# Patient Record
Sex: FEMALE | Race: WHITE | NOT HISPANIC OR LATINO | Employment: PART TIME | URBAN - METROPOLITAN AREA
[De-identification: names, ages, dates, MRNs, and addresses within clinical notes are randomized per-mention and may not be internally consistent; named-entity substitution may affect disease eponyms.]

---
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
## 2020-04-17 ENCOUNTER — HOSPITAL ENCOUNTER (OUTPATIENT)
Dept: LABOR AND DELIVERY | Facility: HOSPITAL | Age: 20
Discharge: HOME | End: 2020-04-17
Attending: OBSTETRICS & GYNECOLOGY

## 2020-04-17 LAB
ALBUMIN SERPL-MCNC: 3.6 G/DL (ref 3.5–5)
ALBUMIN/GLOB SERPL: 1.3 {RATIO} (ref 1.4–2.6)
ALP SERPL-CCNC: 174 U/L (ref 42–98)
ALT SERPL-CCNC: 18 U/L (ref 10–40)
ANION GAP SERPL CALC-SCNC: 17 MMOL/L (ref 8–19)
AST SERPL-CCNC: 18 U/L (ref 15–50)
BASOPHILS # BLD AUTO: 0.08 10*3/UL (ref 0–0.2)
BASOPHILS NFR BLD AUTO: 0.6 % (ref 0–3)
BILIRUB SERPL-MCNC: <0.15 MG/DL (ref 0.2–1.3)
BUN SERPL-MCNC: 7 MG/DL (ref 5–25)
BUN/CREAT SERPL: 12 {RATIO} (ref 6–20)
CALCIUM SERPL-MCNC: 9.3 MG/DL (ref 8.7–10.4)
CHLORIDE SERPL-SCNC: 101 MMOL/L (ref 99–111)
CONV ABS IMM GRAN: 0.08 10*3/UL (ref 0–0.2)
CONV CO2: 22 MMOL/L (ref 22–32)
CONV CREATININE URINE, RANDOM: 27.5 MG/DL (ref 10–300)
CONV IMMATURE GRAN: 0.6 % (ref 0–1.8)
CONV PROTEIN TO CREATININE RATIO (RANDOM URINE): 0.3 {RATIO} (ref 0–0.1)
CONV TOTAL PROTEIN: 6.3 G/DL (ref 6.3–8.2)
CREAT UR-MCNC: 0.6 MG/DL (ref 0.5–0.9)
DEPRECATED RDW RBC AUTO: 39.3 FL (ref 36.4–46.3)
EOSINOPHIL # BLD AUTO: 0.97 10*3/UL (ref 0–0.7)
EOSINOPHIL # BLD AUTO: 7.8 % (ref 0–7)
ERYTHROCYTE [DISTWIDTH] IN BLOOD BY AUTOMATED COUNT: 13 % (ref 11.7–14.4)
GFR SERPLBLD BASED ON 1.73 SQ M-ARVRAT: >60 ML/MIN/{1.73_M2}
GLOBULIN UR ELPH-MCNC: 2.7 G/DL (ref 2–3.5)
GLUCOSE SERPL-MCNC: 108 MG/DL (ref 65–99)
HCT VFR BLD AUTO: 36.5 % (ref 37–47)
HGB BLD-MCNC: 11.6 G/DL (ref 12–16)
LYMPHOCYTES # BLD AUTO: 1.68 10*3/UL (ref 1–5)
LYMPHOCYTES NFR BLD AUTO: 13.5 % (ref 20–45)
MCH RBC QN AUTO: 26.7 PG (ref 27–31)
MCHC RBC AUTO-ENTMCNC: 31.8 G/DL (ref 33–37)
MCV RBC AUTO: 83.9 FL (ref 81–99)
MONOCYTES # BLD AUTO: 0.76 10*3/UL (ref 0.2–1.2)
MONOCYTES NFR BLD AUTO: 6.1 % (ref 3–10)
NEUTROPHILS # BLD AUTO: 8.83 10*3/UL (ref 2–8)
NEUTROPHILS NFR BLD AUTO: 71.4 % (ref 30–85)
NRBC CBCN: 0 % (ref 0–0.7)
OSMOLALITY SERPL CALC.SUM OF ELEC: 281 MOSM/KG (ref 273–304)
PLATELET # BLD AUTO: 186 10*3/UL (ref 130–400)
PMV BLD AUTO: 11.9 FL (ref 9.4–12.3)
POTASSIUM SERPL-SCNC: 4 MMOL/L (ref 3.5–5.3)
PROT UR-MCNC: 8.3 MG/DL
RBC # BLD AUTO: 4.35 10*6/UL (ref 4.2–5.4)
SODIUM SERPL-SCNC: 136 MMOL/L (ref 135–147)
WBC # BLD AUTO: 12.4 10*3/UL (ref 4.8–10.8)

## 2020-04-20 ENCOUNTER — HOSPITAL ENCOUNTER (OUTPATIENT)
Dept: LABOR AND DELIVERY | Facility: HOSPITAL | Age: 20
Discharge: HOME | End: 2020-04-20
Attending: OBSTETRICS & GYNECOLOGY

## 2020-04-20 LAB
PROT 24H UR-MRATE: 112.1 MG/(24.H) (ref 42–225)
PROT 24H UR-MRATE: 5.9 MG/DL (ref 0–12)
SPECIMEN VOL 24H UR: 1900 ML

## 2021-02-14 ENCOUNTER — HOSPITAL ENCOUNTER (OUTPATIENT)
Dept: URGENT CARE | Facility: CLINIC | Age: 21
Discharge: HOME | End: 2021-02-14
Attending: NURSE PRACTITIONER

## 2021-02-16 LAB — BACTERIA SPEC AEROBE CULT: NORMAL

## 2021-02-17 LAB — SARS-COV-2 RNA SPEC QL NAA+PROBE: DETECTED

## 2021-05-15 VITALS — HEIGHT: 64 IN | HEART RATE: 74 BPM | BODY MASS INDEX: 23.26 KG/M2 | OXYGEN SATURATION: 98 % | WEIGHT: 136.25 LBS

## 2021-05-16 VITALS — OXYGEN SATURATION: 97 % | HEART RATE: 88 BPM | HEIGHT: 64 IN

## 2021-08-27 PROCEDURE — 87086 URINE CULTURE/COLONY COUNT: CPT | Performed by: EMERGENCY MEDICINE

## 2021-08-27 PROCEDURE — U0003 INFECTIOUS AGENT DETECTION BY NUCLEIC ACID (DNA OR RNA); SEVERE ACUTE RESPIRATORY SYNDROME CORONAVIRUS 2 (SARS-COV-2) (CORONAVIRUS DISEASE [COVID-19]), AMPLIFIED PROBE TECHNIQUE, MAKING USE OF HIGH THROUGHPUT TECHNOLOGIES AS DESCRIBED BY CMS-2020-01-R: HCPCS | Performed by: EMERGENCY MEDICINE

## 2021-08-28 ENCOUNTER — TELEPHONE (OUTPATIENT)
Dept: URGENT CARE | Facility: CLINIC | Age: 21
End: 2021-08-28

## 2021-08-28 NOTE — TELEPHONE ENCOUNTER
----- Message from YULI Ross sent at 8/28/2021  4:53 PM EDT -----  Please call the patient regarding her normal result.  Results of urine culture is not indicative of a urinary tract infection.

## 2021-08-30 ENCOUNTER — TELEPHONE (OUTPATIENT)
Dept: URGENT CARE | Facility: CLINIC | Age: 21
End: 2021-08-30

## 2021-12-20 RX ORDER — LEVONORGESTREL / ETHINYL ESTRADIOL AND ETHINYL ESTRADIOL 150-30(84)
KIT ORAL
Qty: 91 EACH | Refills: 0 | Status: SHIPPED | OUTPATIENT
Start: 2021-12-20 | End: 2022-04-13

## 2022-03-21 RX ORDER — LEVONORGESTREL / ETHINYL ESTRADIOL AND ETHINYL ESTRADIOL 150-30(84)
KIT ORAL
OUTPATIENT
Start: 2022-03-21

## 2022-04-13 ENCOUNTER — INITIAL PRENATAL (OUTPATIENT)
Dept: OBSTETRICS AND GYNECOLOGY | Facility: CLINIC | Age: 22
End: 2022-04-13

## 2022-04-13 VITALS — WEIGHT: 171 LBS | SYSTOLIC BLOOD PRESSURE: 133 MMHG | DIASTOLIC BLOOD PRESSURE: 92 MMHG | BODY MASS INDEX: 29.35 KG/M2

## 2022-04-13 DIAGNOSIS — Q21.10 ASD (ATRIAL SEPTAL DEFECT): ICD-10-CM

## 2022-04-13 DIAGNOSIS — Z34.80 SUPERVISION OF OTHER NORMAL PREGNANCY, ANTEPARTUM: Primary | ICD-10-CM

## 2022-04-13 DIAGNOSIS — Q21.12 PATENT FORAMEN OVALE: ICD-10-CM

## 2022-04-13 LAB
ABO GROUP BLD: NORMAL
B-HCG UR QL: POSITIVE
BASOPHILS # BLD AUTO: 0.05 10*3/MM3 (ref 0–0.2)
BASOPHILS NFR BLD AUTO: 0.6 % (ref 0–1.5)
BLD GP AB SCN SERPL QL: NEGATIVE
DEPRECATED RDW RBC AUTO: 38.8 FL (ref 37–54)
EOSINOPHIL # BLD AUTO: 0.05 10*3/MM3 (ref 0–0.4)
EOSINOPHIL NFR BLD AUTO: 0.6 % (ref 0.3–6.2)
ERYTHROCYTE [DISTWIDTH] IN BLOOD BY AUTOMATED COUNT: 12.3 % (ref 12.3–15.4)
EXPIRATION DATE: ABNORMAL
GLUCOSE UR STRIP-MCNC: NEGATIVE MG/DL
HBV SURFACE AG SERPL QL IA: NORMAL
HCT VFR BLD AUTO: 43.4 % (ref 34–46.6)
HGB BLD-MCNC: 14.4 G/DL (ref 12–15.9)
HIV1+2 AB SER QL: NORMAL
IMM GRANULOCYTES # BLD AUTO: 0.04 10*3/MM3 (ref 0–0.05)
IMM GRANULOCYTES NFR BLD AUTO: 0.5 % (ref 0–0.5)
INTERNAL NEGATIVE CONTROL: ABNORMAL
INTERNAL POSITIVE CONTROL: ABNORMAL
LYMPHOCYTES # BLD AUTO: 1.85 10*3/MM3 (ref 0.7–3.1)
LYMPHOCYTES NFR BLD AUTO: 21.5 % (ref 19.6–45.3)
Lab: ABNORMAL
MCH RBC QN AUTO: 28.9 PG (ref 26.6–33)
MCHC RBC AUTO-ENTMCNC: 33.2 G/DL (ref 31.5–35.7)
MCV RBC AUTO: 87.1 FL (ref 79–97)
MONOCYTES # BLD AUTO: 0.44 10*3/MM3 (ref 0.1–0.9)
MONOCYTES NFR BLD AUTO: 5.1 % (ref 5–12)
NEUTROPHILS NFR BLD AUTO: 6.19 10*3/MM3 (ref 1.7–7)
NEUTROPHILS NFR BLD AUTO: 71.7 % (ref 42.7–76)
NRBC BLD AUTO-RTO: 0 /100 WBC (ref 0–0.2)
PLATELET # BLD AUTO: 222 10*3/MM3 (ref 140–450)
PMV BLD AUTO: 11.7 FL (ref 6–12)
PROT UR STRIP-MCNC: NEGATIVE MG/DL
RBC # BLD AUTO: 4.98 10*6/MM3 (ref 3.77–5.28)
RH BLD: POSITIVE
RPR SER QL: NORMAL
WBC NRBC COR # BLD: 8.62 10*3/MM3 (ref 3.4–10.8)

## 2022-04-13 PROCEDURE — 87661 TRICHOMONAS VAGINALIS AMPLIF: CPT | Performed by: OBSTETRICS & GYNECOLOGY

## 2022-04-13 PROCEDURE — G0432 EIA HIV-1/HIV-2 SCREEN: HCPCS | Performed by: OBSTETRICS & GYNECOLOGY

## 2022-04-13 PROCEDURE — G0123 SCREEN CERV/VAG THIN LAYER: HCPCS | Performed by: OBSTETRICS & GYNECOLOGY

## 2022-04-13 PROCEDURE — 81025 URINE PREGNANCY TEST: CPT | Performed by: OBSTETRICS & GYNECOLOGY

## 2022-04-13 PROCEDURE — 87086 URINE CULTURE/COLONY COUNT: CPT | Performed by: OBSTETRICS & GYNECOLOGY

## 2022-04-13 PROCEDURE — 0501F PRENATAL FLOW SHEET: CPT | Performed by: OBSTETRICS & GYNECOLOGY

## 2022-04-13 PROCEDURE — 87591 N.GONORRHOEAE DNA AMP PROB: CPT | Performed by: OBSTETRICS & GYNECOLOGY

## 2022-04-13 PROCEDURE — 87491 CHLMYD TRACH DNA AMP PROBE: CPT | Performed by: OBSTETRICS & GYNECOLOGY

## 2022-04-13 PROCEDURE — 83020 HEMOGLOBIN ELECTROPHORESIS: CPT | Performed by: OBSTETRICS & GYNECOLOGY

## 2022-04-13 PROCEDURE — 86803 HEPATITIS C AB TEST: CPT | Performed by: OBSTETRICS & GYNECOLOGY

## 2022-04-13 NOTE — PROGRESS NOTES
OB Initial Visit    CC- Here for care of current pregnancy     Subjective:  21 y.o.  presenting for her first obstetrical visit.    LMP: Patient's last menstrual period was 2022.   She has no complaints today.  She denies any vaginal bleeding.  She reports some nausea.    Reviewed and updated:  OBHx, GYNHx (STDs), PMHx, Medications, Allergies, PSHx, Social Hx, Preventative Hx (PAP), Hx of abuse/safe environment, Vaccine Hx including hx of chickenpox or vaccine, Genetic Hx (pt, FOB, both families).        Objective:  /92   Wt 77.6 kg (171 lb)   LMP 2022   BMI 29.35 kg/m²   General- NAD, alert and oriented, appropriate  Psych- Normal mood, good memory  Neck- No masses, no thyroid enlargement  CV- Regular rhythm, no murnurs  Resp- CTA to bases, no wheezes  Abdomen- Soft, non distended, non tender, no masses     External genitalia- Normal, no lesions  Urethra- Normal, no masses, non tender  Vagina- Normal, no discharge  Bladder- Normal, no masses, non tender  Cvx- Normal, no lesions, no discharge, no CMT  Uterus- Normal shape and consistency, non tender, Consistent with dates  Adnexa- Normal, no mass, non tender    Lymphatic- No palpable neck, axillary, or groin nodes  Ext- No edema, no cyanosis    Skin- No lesions, no rashes, no acanthosis nigricans    Bedside ultrasound: Sweet intrauterine gestation with positive fetal cardiac activity.  Heart rate 165 bpm.  No gross abnormalities noted on brief and limited scan    Assessment and Plan:  Diagnoses and all orders for this visit:    1. Supervision of other normal pregnancy, antepartum (Primary)  -     POC Urinalysis Dipstick  -     POC Pregnancy, Urine  -     IGP,CtNgTv,rfx Aptima HPV ASCU  -     OB Panel With HIV  -     Urine Culture - Urine, Urine, Clean Catch  -     Hemoglobinopathy Fractionation [place]  -     US Ob < 14 Weeks Single or First Gestation; Future    2. ASD (atrial septal defect)  -     Adult Transthoracic Echo Complete w/  Color, Spectral and Contrast if necessary per protocol; Future    3. Patent foramen ovale      9w6d    Genetic Screening: Counseled.  Declines all.     Vaccines: Recommend FLU vaccine this season, R/B discussed  Recommend COVID vaccine, R/B discussed    Counseling: Nutrition discussed, calories, activity/exercise in pregnancy  Discussed dietary restrictions/safety food preparation in pregnancy  Reviewed what to expect prenatal visits, office providers  Appropriate trimester precautions provided, N/V, vag bleeding, cramping  Questions answered    Return in about 4 weeks (around 5/11/2022) for Recheck.      Jhonny Tabares MD  04/13/2022

## 2022-04-14 LAB — HCV AB SER DONR QL: NORMAL

## 2022-04-15 LAB
BACTERIA SPEC AEROBE CULT: ABNORMAL
HGB A MFR BLD ELPH: 97.3 % (ref 96.4–98.8)
HGB A2 MFR BLD ELPH: 2.7 % (ref 1.8–3.2)
HGB F MFR BLD ELPH: 0 % (ref 0–2)
HGB FRACT BLD-IMP: NORMAL
HGB S MFR BLD ELPH: 0 %
RUBV IGG SERPL IA-ACNC: 2.59 INDEX

## 2022-04-15 NOTE — ASSESSMENT & PLAN NOTE
Check dating ultrasound  Check prenatal labs  Patient declines prenatal genetic screening  Continue prenatal vitamins  Recommended COVID-19 vaccine and flu vaccine.  Reviewed nutrition, exercise and dietary restrictions in pregnancy.  Reviewed appropriate weight gain in pregnancy  Reviewed office visit schedule and call rotation

## 2022-04-21 LAB
C TRACH RRNA CVX QL NAA+PROBE: NEGATIVE
CONV .: NORMAL
CYTOLOGIST CVX/VAG CYTO: NORMAL
CYTOLOGY CVX/VAG DOC CYTO: NORMAL
CYTOLOGY CVX/VAG DOC THIN PREP: NORMAL
DX ICD CODE: NORMAL
HIV 1 & 2 AB SER-IMP: NORMAL
N GONORRHOEA RRNA CVX QL NAA+PROBE: NEGATIVE
OTHER STN SPEC: NORMAL
STAT OF ADQ CVX/VAG CYTO-IMP: NORMAL
T VAGINALIS RRNA SPEC QL NAA+PROBE: NEGATIVE

## 2022-05-09 ENCOUNTER — HOSPITAL ENCOUNTER (OUTPATIENT)
Dept: CARDIOLOGY | Facility: HOSPITAL | Age: 22
Discharge: HOME | End: 2022-05-09
Admitting: OBSTETRICS & GYNECOLOGY

## 2022-05-09 DIAGNOSIS — Q21.10 ASD (ATRIAL SEPTAL DEFECT): ICD-10-CM

## 2022-05-09 LAB
BH CV ECHO MEAS - AO ROOT DIAM: 2.2 CM
BH CV ECHO MEAS - EF(MOD-BP): 65 %
BH CV ECHO MEAS - IVSD: 0.8 CM
BH CV ECHO MEAS - LA DIMENSION(2D): 2.6 CM
BH CV ECHO MEAS - LAT PEAK E' VEL: 13 CM/SEC
BH CV ECHO MEAS - LVIDD: 4.4 CM
BH CV ECHO MEAS - LVIDS: 2.2 CM
BH CV ECHO MEAS - LVPWD: 0.8 CM
BH CV ECHO MEAS - MED PEAK E' VEL: 19 CM/SEC
BH CV ECHO MEAS - MV A MAX VEL: 55 CM/SEC
BH CV ECHO MEAS - MV DEC TIME: 157 MSEC
BH CV ECHO MEAS - MV E MAX VEL: 83 CM/SEC
BH CV ECHO MEAS - MV E/A: 1.5
BH CV ECHO MEASUREMENTS AVERAGE E/E' RATIO: 5.19
IVRT: 66 MSEC
LEFT ATRIUM VOLUME INDEX: 9 ML/M2
MAXIMAL PREDICTED HEART RATE: 198 BPM
STRESS TARGET HR: 168 BPM

## 2022-05-09 PROCEDURE — 93306 TTE W/DOPPLER COMPLETE: CPT

## 2022-05-11 ENCOUNTER — HOSPITAL ENCOUNTER (OUTPATIENT)
Dept: ULTRASOUND IMAGING | Facility: HOSPITAL | Age: 22
Discharge: HOME | End: 2022-05-11
Admitting: OBSTETRICS & GYNECOLOGY

## 2022-05-11 DIAGNOSIS — Z34.80 SUPERVISION OF OTHER NORMAL PREGNANCY, ANTEPARTUM: ICD-10-CM

## 2022-05-11 PROCEDURE — 76801 OB US < 14 WKS SINGLE FETUS: CPT

## 2022-05-17 ENCOUNTER — ROUTINE PRENATAL (OUTPATIENT)
Dept: OBSTETRICS AND GYNECOLOGY | Facility: CLINIC | Age: 22
End: 2022-05-17

## 2022-05-17 VITALS — BODY MASS INDEX: 29.18 KG/M2 | SYSTOLIC BLOOD PRESSURE: 118 MMHG | WEIGHT: 170 LBS | DIASTOLIC BLOOD PRESSURE: 77 MMHG

## 2022-05-17 DIAGNOSIS — Q21.10 ASD (ATRIAL SEPTAL DEFECT): ICD-10-CM

## 2022-05-17 DIAGNOSIS — Q21.12 PATENT FORAMEN OVALE: ICD-10-CM

## 2022-05-17 DIAGNOSIS — Z34.80 SUPERVISION OF OTHER NORMAL PREGNANCY, ANTEPARTUM: Primary | ICD-10-CM

## 2022-05-17 LAB
GLUCOSE UR STRIP-MCNC: NEGATIVE MG/DL
PROT UR STRIP-MCNC: NEGATIVE MG/DL

## 2022-05-17 PROCEDURE — 0502F SUBSEQUENT PRENATAL CARE: CPT | Performed by: OBSTETRICS & GYNECOLOGY

## 2022-05-17 NOTE — PROGRESS NOTES
OB FOLLOW UP    CC: Scheduled OB routine FU     Prenatal care complicated by:   Patient Active Problem List   Diagnosis   • Supervision of other normal pregnancy, antepartum   • ASD (atrial septal defect)   • Patent foramen ovale       Subjective:   Patient has: No complaints, No leaking fluid, No vaginal bleeding, No contractions  No fetal movement yet    Objective:  Urine glucose/protein- see flow sheet      /77   Wt 77.1 kg (170 lb)   LMP 02/03/2022   BMI 29.18 kg/m²   See OB flow for LE edema, and cvx exam if applicable  FHT: 142 BPM   Uterine Size: size equals dates      Assessment and Plan:  Diagnoses and all orders for this visit:    1. Supervision of other normal pregnancy, antepartum (Primary)  Assessment & Plan:  Reviewed prenatal lab results and confirmed EDC.  Continue prenatal vitamins    Orders:  -     POC Urinalysis Dipstick    2. Patent foramen ovale    3. ASD (atrial septal defect)  Overview:  Never required repair.  Does not require regular follow-up with cardiology.  No problems with her first pregnancy and delivery.    Assessment & Plan:  Refer to cardiology for evaluation  Refer to [facility] for anatomy ultrasound    Orders:  -     Ambulatory Referral to Cardiology  -     Ambulatory Referral to Perinatology        14w5d  Reassuring pregnancy progress    Counseling: Second trimester precautions  OB precautions, leaking, VB, shelton rivera vs PTL/Labor    Questions answered    Return in about 4 weeks (around 6/14/2022) for Recheck.      Jhonny Tabares MD  05/17/2022

## 2022-05-22 ENCOUNTER — PATIENT MESSAGE (OUTPATIENT)
Dept: OBSTETRICS AND GYNECOLOGY | Facility: CLINIC | Age: 22
End: 2022-05-22

## 2022-06-01 NOTE — TELEPHONE ENCOUNTER
Patient informed spoke to [facility] yesterday and they were going to reach out to her to set up an appointment. Phone number given for the patient to contact them directly for that visit.

## 2022-06-08 ENCOUNTER — OFFICE VISIT (OUTPATIENT)
Dept: CARDIOLOGY | Facility: CLINIC | Age: 22
End: 2022-06-08

## 2022-06-08 VITALS
HEART RATE: 90 BPM | SYSTOLIC BLOOD PRESSURE: 125 MMHG | DIASTOLIC BLOOD PRESSURE: 93 MMHG | BODY MASS INDEX: 29.42 KG/M2 | WEIGHT: 171.4 LBS

## 2022-06-08 DIAGNOSIS — Q21.12 PATENT FORAMEN OVALE: Primary | ICD-10-CM

## 2022-06-08 PROCEDURE — 99203 OFFICE O/P NEW LOW 30 MIN: CPT | Performed by: INTERNAL MEDICINE

## 2022-06-08 NOTE — PROGRESS NOTES
Chief Complaint  Atrial Septal Defect    Subjective            Rosa Reid presents to [facility] CARDIOLOGY  History of Present Illness    Rosa is a 21-year-old white female.  She was previously seeing [facility] and has a PFO on previous echo done in 2018.  At that time she was clinically stable and was also pregnant.  She had some occasional palpitations but her baseline EKG was normal and she was followed as needed at that point.  She is currently again pregnant 18 weeks, she is not having any significant issues.  She denies significant palpitations chest pain or shortness of breath.  No dizziness or presyncope.    PMH  Past Medical History:   Diagnosis Date   • Anxiety    • ASD (atrial septal defect)    • Depression          SURGICALHX  Past Surgical History:   Procedure Laterality Date   • TONSILLECTOMY          SOC  Social History     Socioeconomic History   • Marital status: Single   • Number of children: 1   Tobacco Use   • Smoking status: Never Smoker   • Smokeless tobacco: Never Used   Vaping Use   • Vaping Use: Never used   Substance and Sexual Activity   • Alcohol use: Never   • Drug use: Never   • Sexual activity: Yes     Partners: Male         FAMHX  Family History   Problem Relation Age of Onset   • Breast cancer Maternal Aunt 35   • Cervical cancer Maternal Aunt           ALLERGY  Allergies   Allergen Reactions   • Azithromycin Rash   • Poison Ivy Extract Swelling   • Poison [place] Extract Swelling        MEDSCURRENT    Current Outpatient Medications:   •  PRENATAL MV-MIN-FE FUM-FA-DHA PO, Take  by mouth., Disp: , Rfl:       Review of Systems   Constitutional: Negative.   HENT: Negative.    Eyes: Negative.    Cardiovascular: Negative for chest pain, dyspnea on exertion and palpitations.   Respiratory: Negative.  Negative for shortness of breath.    Endocrine: Negative.    Hematologic/Lymphatic: Negative.    Skin: Negative.    Musculoskeletal: Negative.     Gastrointestinal: Negative.    Genitourinary: Negative.    Neurological: Negative.    Psychiatric/Behavioral: Negative.         Objective     /93   Pulse 90   Wt 77.7 kg (171 lb 6.4 oz)   BMI 29.42 kg/m²       General Appearance:   · well developed  · well nourished  HENT:   · oropharynx moist  · lips not cyanotic  Neck:  · thyroid not enlarged  · supple  Respiratory:  · no respiratory distress  · normal breath sounds  · no rales  Cardiovascular:  · no jugular venous distention  · regular rhythm  · apical impulse normal  · S1 normal, S2 normal  · no S3, no S4   · no murmur  · no rub, no thrill  · carotid pulses normal; no bruit  · pedal pulses normal  · lower extremity edema: none    Musculoskeletal:  · no clubbing of fingers.   · normocephalic, head atraumatic  Skin:   · warm, dry  Psychiatric:  · judgement and insight appropriate  · normal mood and affect      Result Review :     The following data was reviewed by: Reyes Vincent MD on 06/08/2022:        CBC    CBC 4/13/22   WBC 8.62   RBC 4.98   Hemoglobin 14.4   Hematocrit 43.4   MCV 87.1   MCH 28.9   MCHC 33.2   RDW 12.3   Platelets 222                     Data reviewed: Previous cardiology records reviewed, echo 2018 showed small PFO with no significant shunting, EKG February 2020 was normal.     Procedures           Assessment and Plan        ASSESSMENT:  Encounter Diagnosis   Name Primary?   • Patent foramen ovale Yes         PLAN:    1.  Rosa has a PFO on prior echo.  She is currently 18 weeks pregnant.  She is having no cardiac symptoms.  No additional work-up or prophylaxis/prevention is required for this PFO.  We discussed the natural history of PFO and that in the vast majority of patients it is clinically silent and causes no clinical sequelae.  There is no particular restrictions or changes in her obstetrical management that I recommend from a cardiac standpoint.  She does not need SBE prophylaxis for this finding.  I will be  happy to see her as needed in the future if symptoms arise.            Patient was given instructions and counseling regarding her condition or for health maintenance advice. Please see specific information pulled into the AVS if appropriate.             JERZY Vincent MD  6/8/2022    09:59 EDT

## 2022-06-14 ENCOUNTER — ROUTINE PRENATAL (OUTPATIENT)
Dept: OBSTETRICS AND GYNECOLOGY | Facility: CLINIC | Age: 22
End: 2022-06-14

## 2022-06-14 ENCOUNTER — PATIENT ROUNDING (BHMG ONLY) (OUTPATIENT)
Dept: CARDIOLOGY | Facility: CLINIC | Age: 22
End: 2022-06-14

## 2022-06-14 VITALS — BODY MASS INDEX: 29.18 KG/M2 | SYSTOLIC BLOOD PRESSURE: 123 MMHG | DIASTOLIC BLOOD PRESSURE: 81 MMHG | WEIGHT: 170 LBS

## 2022-06-14 DIAGNOSIS — O26.812 PREGNANCY RELATED FATIGUE IN SECOND TRIMESTER: ICD-10-CM

## 2022-06-14 DIAGNOSIS — Z34.80 SUPERVISION OF OTHER NORMAL PREGNANCY, ANTEPARTUM: Primary | ICD-10-CM

## 2022-06-14 DIAGNOSIS — Q21.12 PATENT FORAMEN OVALE: ICD-10-CM

## 2022-06-14 LAB
GLUCOSE UR STRIP-MCNC: NEGATIVE MG/DL
PROT UR STRIP-MCNC: NEGATIVE MG/DL
T4 FREE SERPL-MCNC: 1.19 NG/DL (ref 0.93–1.7)
TSH SERPL DL<=0.05 MIU/L-ACNC: 1.07 UIU/ML (ref 0.27–4.2)

## 2022-06-14 PROCEDURE — 84439 ASSAY OF FREE THYROXINE: CPT | Performed by: OBSTETRICS & GYNECOLOGY

## 2022-06-14 PROCEDURE — 84443 ASSAY THYROID STIM HORMONE: CPT | Performed by: OBSTETRICS & GYNECOLOGY

## 2022-06-14 PROCEDURE — 0502F SUBSEQUENT PRENATAL CARE: CPT | Performed by: OBSTETRICS & GYNECOLOGY

## 2022-06-14 NOTE — PROGRESS NOTES
"June 14, 2022    Hello, may I speak with Rosa Reid?    My name is Althea     I am  with [facility]  [facility] C[facility]IOLOG[facility]  [address] [place] [address] [phone].    Before we get started may I verify your date of birth? 2000 VERIFIED    I am calling to officially welcome you to our practice and ask about your recent visit. Is this a good time to talk? YES    Tell me about your visit with us. What things went well?  \"EVERYONE WAS REALLY NICE\" \"I DID NOT HAVE TO WAIT.  I GOT IN AND GOT OUT\"       We're always looking for ways to make our patients' experiences even better. Do you have recommendations on ways we may improve?  NO     Overall were you satisfied with your first visit to our practice? YES       I appreciate you taking the time to speak with me today. Is there anything else I can do for you? NO       Thank you, and have a great day.      "

## 2022-06-14 NOTE — PROGRESS NOTES
OB FOLLOW UP    CC: Scheduled OB routine FU     Prenatal care complicated by:   Patient Active Problem List   Diagnosis   • Supervision of other normal pregnancy, antepartum   • Patent foramen ovale       Subjective:   Patient has: No complaints, No leaking fluid, No vaginal bleeding, No contractions, Adequate FM   Reports having some flaking of her skin at the tips of her fingers and her toes.  Denies any pain associated with this.  Denies any numbness or tingling.  Reports some fatigue and lightheadedness.    Objective:  Urine glucose/protein- see flow sheet      /81   Wt 77.1 kg (170 lb)   LMP 2022   BMI 29.18 kg/m²   See OB flow for LE edema, and cvx exam if applicable  FHT: 152 BPM   Uterine Size: size equals dates    MFM ultrasound reviewed from 2022.  Cardiology visit from 2022 reviewed    Assessment and Plan:  Diagnoses and all orders for this visit:    1. Supervision of other normal pregnancy, antepartum (Primary)  Assessment & Plan:  Reviewed MFM consult note and cardiology consult note with patient.  Continue prenatal vitamins      Orders:  -     POC Urinalysis Dipstick    2. Patent foramen ovale  Overview:  2022: Cardiology visit completed.  No anticipated cardiac issues.  No further recommendations or follow-up recommended.  2022: MFM consult and ultrasound performed.  Limited anatomy.  Recommended  fetal echo        3. Pregnancy related fatigue in second trimester  -     TSH; Future  -     T4, Free  -     TSH        18w5d  Reassuring pregnancy progress    Counseling: Second trimester precautions  OB precautions, leaking, VB, shelton rivera vs PTL/Labor    Questions answered    Return in about 4 weeks (around 2022).      Jhonny Tabares MD  2022

## 2022-07-14 NOTE — PROGRESS NOTES
OB FOLLOW UP      Chief Complaint   Patient presents with   • Routine Prenatal Visit     Subjective:   • Feet and hands peeling.  Hands not peeling any more, now just feet.  No itching.    • Occas dizzy.  Not associated w sitting up or standing.  BP was wnl.  Worse after a shower.   Resolved after a nap.   Occas w HA. Nl TFTs in .    Objective:  /82   Wt 79.8 kg (176 lb)   LMP 2022   BMI 30.21 kg/m²  2.268 kg (5 lb)  Uterine Size: size equals dates  FHT: 110-160 BPM  Feet peeling, no redness    See OB flow for edema, cvx exam if performed, and Upro/Uglu    Assessment and Plan:  23w0d  Reassuring pregnancy progress.  Questions answered.  Diagnoses and all orders for this visit:    1. Supervision of other normal pregnancy, antepartum (Primary)  Overview:  [place] finalized: 11/10/22 by LMP and 13week US    Genetic testing:  Declined all at initial OB OV    COVID: 2022 recommended  Tdap:    ?Sterilization:    MFM US: 22 15%ile, prominent aorta on fetus, breech  FU US:     PROBLEM LIST/PLAN:  `Borderline fetal growth and prominenet aorta- Fetal echo and fetal growth end of July/early August.   `PFO (pt states was ASD) 2022: Cardiology visit completed Dr. Vincent.  No anticipated cardiac issues.  No further recommendations or follow-up recommended.        Orders:  -     POC Urinalysis Dipstick    2. Peeling skin feet  -     terbinafine (lamISIL) 1 % cream; Apply 1 application topically to the appropriate area as directed 2 (Two) Times a Day.  Dispense: 1 each; Refill: 0    3. Dizziness  -     Comprehensive Metabolic Panel  -     Vitamin D 25 Hydroxy  -     CBC (No Diff)    4. Patent foramen ovale  Overview:  2022: Cardiology visit completed.  No anticipated cardiac issues.  No further recommendations or follow-up recommended.  2022: MFM consult and ultrasound performed.  Limited anatomy.  Recommended  fetal echo        Counseling:    • Second trimester precautions  • VACCINE  importance in pregnancy discussed.  Maternal and fetal risk of not being vaccinated reviewed NLT increased risk maternal/fetal severity of illness/death, PTD, CS, hemorrhage, HTN, possible IUFD.  Significant maternal and fetal/infant benefit w vaccination.  FDA approval and ACOG/SMFM/CDC strong recommendation in pregnancy.  Questions answered.   • Reasons for LIGHTHEADEDNESS in pregnancy reviewed.  Recommend small frequent meals, high in protein, staying hydrated, avoid extremes in heat or prolonged standing/sitting, avoid hot showers and/or rising quickly from lying/sitting to standing.   • Continue PNV.  Importance of healthy eating and staying active.    Return in about 4 weeks (around 8/15/2022) for Follow up OB then [id number] x2weeks.            Cleo Riley,   07/18/2022    [facility]  [facility] [place] [address]  Dept: [phone]  Dept Fax: [phone]  Loc: [phone]  Loc Fax: [phone]

## 2022-07-18 ENCOUNTER — ROUTINE PRENATAL (OUTPATIENT)
Dept: OBSTETRICS AND GYNECOLOGY | Facility: CLINIC | Age: 22
End: 2022-07-18

## 2022-07-18 VITALS — WEIGHT: 176 LBS | SYSTOLIC BLOOD PRESSURE: 131 MMHG | DIASTOLIC BLOOD PRESSURE: 82 MMHG | BODY MASS INDEX: 30.21 KG/M2

## 2022-07-18 DIAGNOSIS — Z34.80 SUPERVISION OF OTHER NORMAL PREGNANCY, ANTEPARTUM: Primary | ICD-10-CM

## 2022-07-18 DIAGNOSIS — R42 DIZZINESS: ICD-10-CM

## 2022-07-18 DIAGNOSIS — R23.4 PEELING SKIN: ICD-10-CM

## 2022-07-18 DIAGNOSIS — Q21.12 PATENT FORAMEN OVALE: ICD-10-CM

## 2022-07-18 LAB
GLUCOSE UR STRIP-MCNC: NEGATIVE MG/DL
PROT UR STRIP-MCNC: NEGATIVE MG/DL

## 2022-07-18 PROCEDURE — 0502F SUBSEQUENT PRENATAL CARE: CPT | Performed by: OBSTETRICS & GYNECOLOGY

## 2022-07-18 RX ORDER — PRENATAL VIT 91/IRON/FOLIC/DHA 28-975-200
1 COMBINATION PACKAGE (EA) ORAL 2 TIMES DAILY
Qty: 1 EACH | Refills: 0 | Status: ON HOLD | OUTPATIENT
Start: 2022-07-18 | End: 2022-10-03

## 2022-08-01 PROBLEM — O36.5990 POOR FETAL GROWTH AFFECTING MANAGEMENT OF MOTHER, ANTEPARTUM: Status: ACTIVE | Noted: 2022-08-01

## 2022-08-18 ENCOUNTER — PATIENT OUTREACH (OUTPATIENT)
Dept: LABOR AND DELIVERY | Facility: HOSPITAL | Age: 22
End: 2022-08-18

## 2022-08-18 ENCOUNTER — REFERRAL TRIAGE (OUTPATIENT)
Dept: LABOR AND DELIVERY | Facility: HOSPITAL | Age: 22
End: 2022-08-18

## 2022-08-18 PROBLEM — O09.93 HIGH-RISK PREGNANCY IN THIRD TRIMESTER: Status: ACTIVE | Noted: 2022-04-13

## 2022-08-18 NOTE — PROGRESS NOTES
OB FOLLOW UP      Chief Complaint   Patient presents with   • Routine Prenatal Visit     Subjective:   • No complaints  • did not go to MFM yesterday, pt had questions re need    Objective:  /76   Wt 81.2 kg (179 lb)   LMP 02/03/2022   BMI 30.73 kg/m²  3.629 kg (8 lb)  Uterine Size: size equals dates  FHT: 110-160 BPM    See OB flow for edema, cvx exam if performed, and Upro/Uglu    Assessment and Plan:  28w0d  Reassuring pregnancy progress.  Questions answered.  Diagnoses and all orders for this visit:    1. High-risk pregnancy in third trimester (Primary)  Overview:  [place] finalized: 11/10/22 by LMP and 13week US    Genetic testing:  Declined all at initial OB OV.  8/19/2022 NIPS    COVID: 8/19/2022 declines  Tdap:  Rx 8/19/2022     ?Sterilization: No    MFM US: 7/7/22 15%ile, prominent aorta on fetus, breech  FU US: 7/27/22 MFM 9%ile, prominent aorta  FU US: 8/11/11 MFM nl SD, prominent aorta, possible VSD, breech, TASHIA 15    PROBLEM LIST/PLAN:  IUGR- 8/19/2022 Weekly fetal dopplers, Fetal echo - pt will reschedule (missed 8/5).  Pt missed 8/18 MFM FU, she will rescheduled for next week w growth and S:D, Importance of FU reviewed.  Weekly testing will be determined by MFM at next US next week.   `PFO (pt states was ASD) 6/8/2022: Cardiology visit completed Dr. Vincent.  No anticipated cardiac issues.  No further recommendations or follow-up recommended.      Orders:  -     POC Urinalysis Dipstick  -     Gestational Diabetes Screen 1 Hour  -     CBC (No Diff)  -     INVITAE NIPS; Future    Counseling:    • OB precautions, leaking, VB, shelton rivera vs PTL/Labor  • FKC  • IUGR discussed, potential etiologies.  Desires NIPS.  Reviewed MFM US findings in detail and discussed.  • Importance of FU w MFM weekly and ADD next week growth ALSO.  Pt will call to schedule.  Questions answered.   • Continue PNV.  Importance of healthy eating and staying active.    Return in about 2 weeks (around 9/2/2022) for FU OB q2wks  to 36weeks.            Cleo Riley DO  08/19/2022    [facility]  [address] [place] [address]  Dept: [phone]  Dept Fax: [phone]  Loc: [phone]  Loc Fax: [phone]

## 2022-08-18 NOTE — OUTREACH NOTE
[facility]  Unable to Reach       Questions/Answers    Flowsheet Row Responses   Pending Outreach Confirm Patient Interest   Call Attempt First   Outcome No answer/busy          Sridevi Watts RN  Maternity Nurse Navigator    8/18/2022, 15:14 EDT

## 2022-08-19 ENCOUNTER — ROUTINE PRENATAL (OUTPATIENT)
Dept: OBSTETRICS AND GYNECOLOGY | Facility: CLINIC | Age: 22
End: 2022-08-19

## 2022-08-19 VITALS — DIASTOLIC BLOOD PRESSURE: 76 MMHG | BODY MASS INDEX: 30.73 KG/M2 | WEIGHT: 179 LBS | SYSTOLIC BLOOD PRESSURE: 132 MMHG

## 2022-08-19 DIAGNOSIS — O36.5990 POOR FETAL GROWTH AFFECTING MANAGEMENT OF MOTHER, ANTEPARTUM, SINGLE OR UNSPECIFIED FETUS: ICD-10-CM

## 2022-08-19 DIAGNOSIS — O09.93 HIGH-RISK PREGNANCY IN THIRD TRIMESTER: Primary | ICD-10-CM

## 2022-08-19 LAB
25(OH)D3 SERPL-MCNC: 28.5 NG/ML (ref 30–100)
ALBUMIN SERPL-MCNC: 3.8 G/DL (ref 3.5–5.2)
ALBUMIN/GLOB SERPL: 1.7 G/DL
ALP SERPL-CCNC: 90 U/L (ref 39–117)
ALT SERPL W P-5'-P-CCNC: 15 U/L (ref 1–33)
ANION GAP SERPL CALCULATED.3IONS-SCNC: 13.8 MMOL/L (ref 5–15)
AST SERPL-CCNC: 17 U/L (ref 1–32)
BILIRUB SERPL-MCNC: 0.3 MG/DL (ref 0–1.2)
BUN SERPL-MCNC: 5 MG/DL (ref 6–20)
BUN/CREAT SERPL: 10.4 (ref 7–25)
CALCIUM SPEC-SCNC: 9.2 MG/DL (ref 8.6–10.5)
CHLORIDE SERPL-SCNC: 103 MMOL/L (ref 98–107)
CO2 SERPL-SCNC: 20.2 MMOL/L (ref 22–29)
CREAT SERPL-MCNC: 0.48 MG/DL (ref 0.57–1)
DEPRECATED RDW RBC AUTO: 37 FL (ref 37–54)
EGFRCR SERPLBLD CKD-EPI 2021: 137.5 ML/MIN/1.73
ERYTHROCYTE [DISTWIDTH] IN BLOOD BY AUTOMATED COUNT: 12.4 % (ref 12.3–15.4)
GLOBULIN UR ELPH-MCNC: 2.3 GM/DL
GLUCOSE 1H P GLC SERPL-MCNC: 123 MG/DL (ref 65–139)
GLUCOSE SERPL-MCNC: 115 MG/DL (ref 65–99)
GLUCOSE UR STRIP-MCNC: NEGATIVE MG/DL
HCT VFR BLD AUTO: 38 % (ref 34–46.6)
HGB BLD-MCNC: 12.3 G/DL (ref 12–15.9)
MCH RBC QN AUTO: 26.6 PG (ref 26.6–33)
MCHC RBC AUTO-ENTMCNC: 32.4 G/DL (ref 31.5–35.7)
MCV RBC AUTO: 82.1 FL (ref 79–97)
PLATELET # BLD AUTO: 166 10*3/MM3 (ref 140–450)
PMV BLD AUTO: 11.8 FL (ref 6–12)
POTASSIUM SERPL-SCNC: 3.9 MMOL/L (ref 3.5–5.2)
PROT SERPL-MCNC: 6.1 G/DL (ref 6–8.5)
PROT UR STRIP-MCNC: NEGATIVE MG/DL
RBC # BLD AUTO: 4.63 10*6/MM3 (ref 3.77–5.28)
SODIUM SERPL-SCNC: 137 MMOL/L (ref 136–145)
WBC NRBC COR # BLD: 10.64 10*3/MM3 (ref 3.4–10.8)

## 2022-08-19 PROCEDURE — 85027 COMPLETE CBC AUTOMATED: CPT | Performed by: OBSTETRICS & GYNECOLOGY

## 2022-08-19 PROCEDURE — 80053 COMPREHEN METABOLIC PANEL: CPT | Performed by: OBSTETRICS & GYNECOLOGY

## 2022-08-19 PROCEDURE — 82950 GLUCOSE TEST: CPT | Performed by: OBSTETRICS & GYNECOLOGY

## 2022-08-19 PROCEDURE — 82306 VITAMIN D 25 HYDROXY: CPT | Performed by: OBSTETRICS & GYNECOLOGY

## 2022-08-19 PROCEDURE — 0502F SUBSEQUENT PRENATAL CARE: CPT | Performed by: OBSTETRICS & GYNECOLOGY

## 2022-08-21 PROBLEM — E55.9 VITAMIN D DEFICIENCY: Status: ACTIVE | Noted: 2022-08-21

## 2022-08-21 RX ORDER — CHOLECALCIFEROL (VITAMIN D3) 50 MCG
2000 TABLET ORAL DAILY
Qty: 90 TABLET | Refills: 1 | Status: SHIPPED | OUTPATIENT
Start: 2022-08-21 | End: 2022-10-28 | Stop reason: HOSPADM

## 2022-08-29 NOTE — PROGRESS NOTES
OB FOLLOW UP      Chief Complaint   Patient presents with   • Routine Prenatal Visit     Subjective:   • No complaints    Objective:  /70   Wt 82.1 kg (181 lb)   LMP 02/03/2022   BMI 31.07 kg/m²  4.536 kg (10 lb)  Uterine Size: size equals dates  FHT: 110-160 BPM    See OB flow for edema, cvx exam if performed, and Upro/Uglu    Assessment and Plan:  29w4d  Reassuring pregnancy progress.  Questions answered.  Diagnoses and all orders for this visit:    1. High-risk pregnancy in third trimester (Primary)  Overview:  [place] finalized: 11/10/22 by LMP and 13week US    Genetic testing:  Declined all at initial OB OV.  8/19/2022 NIPS, still pending 8/30/2022     COVID: Declines  Tdap:  Rx 8/19/2022     ?Sterilization: No    MFM US: 7/7/22 15%ile, prominent aorta on fetus, breech  US: 7/27/22 MFM 9%ile, prominent aorta  US: 8/25/22 MFM 2#4oz, <3%ile, AC <3%ile, nl dopplers.  VTX    PROBLEM LIST/PLAN:  IUGR- MFM rec weekly dopplers, biweek APT alt MFM (Fri) and [facility] (qTues), US growth 3weeks, fetal echo pt rescheduled to 9/19  `PFO (pt states was ASD) 6/8/2022: Cardiology visit completed Dr. Vincent.  No anticipated cardiac issues.  No further recommendations or follow-up recommended.        Orders:  -     POC Urinalysis Dipstick  -     Biophysical Profile; Today at [facility]   -     Fetal Nonstress Test; Today AND q Tues [facility]  -     US Fetal Biophysical Profile; Future q Tues [facility]    Counseling:    • OB precautions, leaking, VB, shelton rivera vs PTL/Labor  • FKC  • Continue PNV.  Importance of healthy eating and staying active.    Return in about 1 week (around 9/6/2022) for Q Tues BPP and NST, OV after BPP.            Cleo Riley, DO  08/30/2022    [facility]  [facility] [place] [address]  Dept: [phone]  Dept Fax: [phone]  Loc: [phone]  Loc Fax: [phone]

## 2022-08-30 ENCOUNTER — ROUTINE PRENATAL (OUTPATIENT)
Dept: OBSTETRICS AND GYNECOLOGY | Facility: CLINIC | Age: 22
End: 2022-08-30

## 2022-08-30 ENCOUNTER — HOSPITAL ENCOUNTER (OUTPATIENT)
Facility: HOSPITAL | Age: 22
Discharge: HOME | End: 2022-08-30
Attending: OBSTETRICS & GYNECOLOGY | Admitting: OBSTETRICS & GYNECOLOGY

## 2022-08-30 ENCOUNTER — APPOINTMENT (OUTPATIENT)
Dept: ULTRASOUND IMAGING | Facility: HOSPITAL | Age: 22
End: 2022-08-30

## 2022-08-30 VITALS — DIASTOLIC BLOOD PRESSURE: 74 MMHG | RESPIRATION RATE: 18 BRPM | SYSTOLIC BLOOD PRESSURE: 116 MMHG | HEART RATE: 86 BPM

## 2022-08-30 VITALS — BODY MASS INDEX: 31.07 KG/M2 | SYSTOLIC BLOOD PRESSURE: 122 MMHG | WEIGHT: 181 LBS | DIASTOLIC BLOOD PRESSURE: 70 MMHG

## 2022-08-30 DIAGNOSIS — O09.93 HIGH-RISK PREGNANCY IN THIRD TRIMESTER: Primary | ICD-10-CM

## 2022-08-30 LAB
GLUCOSE UR STRIP-MCNC: NEGATIVE MG/DL
PROT UR STRIP-MCNC: NEGATIVE MG/DL

## 2022-08-30 PROCEDURE — G0463 HOSPITAL OUTPT CLINIC VISIT: HCPCS

## 2022-08-30 PROCEDURE — 76818 FETAL BIOPHYS PROFILE W/NST: CPT

## 2022-08-30 PROCEDURE — 59020 FETAL CONTRACT STRESS TEST: CPT

## 2022-08-30 PROCEDURE — 0502F SUBSEQUENT PRENATAL CARE: CPT | Performed by: OBSTETRICS & GYNECOLOGY

## 2022-08-31 DIAGNOSIS — O09.93 SUPERVISION OF HIGH RISK PREGNANCY IN THIRD TRIMESTER: Primary | ICD-10-CM

## 2022-09-01 LAB — REF LAB TEST METHOD: NORMAL

## 2022-09-02 NOTE — PROGRESS NOTES
OB FOLLOW UP      Chief Complaint   Patient presents with   • Routine Prenatal Visit     Subjective:   • No complaints    Objective:  /76   Wt 82.6 kg (182 lb)   LMP 02/03/2022   BMI 31.24 kg/m²  4.99 kg (11 lb)  Uterine Size: not examined, US today  FHT: 110-160 BPM    See OB flow for edema, cvx exam if performed, and Upro/Uglu    Assessment and Plan:  30w1d  Reassuring pregnancy progress.  Questions answered.  Diagnoses and all orders for this visit:    1. Supervision of high risk pregnancy in third trimester (Primary)  -     POC Urinalysis Dipstick    2. High-risk pregnancy in third trimester  Overview:  [place] finalized: 11/10/22 by LMP and 13week US    Genetic testing:  Declined all at initial OB OV.  NIPS neg XX    COVID: Declines  Tdap:  Vaccinated  Flu:  9/6/2022 recommended    ?Sterilization: No    MFM US: 7/7/22 15%ile, prominent aorta on fetus, breech  US: 7/27/22 MFM 9%ile, prominent aorta  US: 8/25/22 MFM 2#4oz, <3%ile, AC <3%ile, nl dopplers.  VTX    PROBLEM LIST/PLAN:  IUGR- MFM rec weekly dopplers, biweek BPP alt MFM (Fri) and [facility] (qTues NST/BPP), US growth 3weeks, fetal echo pt rescheduled to 9/19  `PFO (pt states was ASD) 6/8/2022: Cardiology visit completed Dr. Vincent.  No anticipated cardiac issues.  No further recommendations or follow-up recommended.          Counseling:    • OB precautions, leaking, VB, shelton rivera vs PTL/Labor  • FKC  • Continue PNV.  Importance of healthy eating and staying active.    Return in about 1 week (around 9/13/2022) for FU OB q1wk to [place]/Delivery.            Cleo Riley DO  09/06/2022    [facility]  [address] [place] [address] [address] [place] [address]  Dept: [phone]  Dept Fax: [phone]  Loc: [phone]  Loc Fax: [phone]

## 2022-09-06 ENCOUNTER — HOSPITAL ENCOUNTER (OUTPATIENT)
Facility: HOSPITAL | Age: 22
Discharge: HOME | End: 2022-09-06
Attending: OBSTETRICS & GYNECOLOGY | Admitting: OBSTETRICS & GYNECOLOGY

## 2022-09-06 ENCOUNTER — ROUTINE PRENATAL (OUTPATIENT)
Dept: OBSTETRICS AND GYNECOLOGY | Facility: CLINIC | Age: 22
End: 2022-09-06

## 2022-09-06 VITALS — SYSTOLIC BLOOD PRESSURE: 115 MMHG | BODY MASS INDEX: 31.24 KG/M2 | WEIGHT: 182 LBS | DIASTOLIC BLOOD PRESSURE: 76 MMHG

## 2022-09-06 VITALS
DIASTOLIC BLOOD PRESSURE: 65 MMHG | RESPIRATION RATE: 16 BRPM | SYSTOLIC BLOOD PRESSURE: 121 MMHG | HEART RATE: 91 BPM | TEMPERATURE: 98.3 F

## 2022-09-06 DIAGNOSIS — O09.93 SUPERVISION OF HIGH RISK PREGNANCY IN THIRD TRIMESTER: Primary | ICD-10-CM

## 2022-09-06 DIAGNOSIS — O09.93 HIGH-RISK PREGNANCY IN THIRD TRIMESTER: ICD-10-CM

## 2022-09-06 LAB
GLUCOSE UR STRIP-MCNC: NEGATIVE MG/DL
PROT UR STRIP-MCNC: NEGATIVE MG/DL

## 2022-09-06 PROCEDURE — 0502F SUBSEQUENT PRENATAL CARE: CPT | Performed by: OBSTETRICS & GYNECOLOGY

## 2022-09-06 PROCEDURE — G0463 HOSPITAL OUTPT CLINIC VISIT: HCPCS

## 2022-09-06 PROCEDURE — 59025 FETAL NON-STRESS TEST: CPT

## 2022-09-06 NOTE — NON STRESS TEST
Obstetrical Non-stress Test Interpretation     Name:  Rosa Reid  MRN: [id number]    21 y.o. female  at 30w5d    Indication: IUGR      Fetal Movement: active  Fetal HR Assessment Method: external  Fetal HR (beats/min): 145  Fetal HR Baseline: normal range  Fetal HR Variability: moderate (amplitude range 6 to 25 bpm)  Fetal HR Accelerations: episodic, greater than/equal to 10 bpm (32 wks gest or less), lasts at least 10 seconds (32 wks gest or less)  Fetal HR Decelerations: absent  Sinusoidal Pattern Present: absent  Fetal HR Tracing Category: Category I    /65 (BP Location: Right arm, Patient Position: Sitting)   Pulse 91   Temp 98.3 °F (36.8 °C) (Oral)   Resp 16   LMP 2022     Reason for test: Intrauterine growth restriction  Date of Test: 2022  Time frame of test: 6004-8336  RN NST Interpretation: Reactive      Shruthi Ortez RN  2022  16:38 EDT

## 2022-09-12 ENCOUNTER — TELEPHONE (OUTPATIENT)
Dept: OBSTETRICS AND GYNECOLOGY | Facility: CLINIC | Age: 22
End: 2022-09-12

## 2022-09-12 DIAGNOSIS — O09.93 SUPERVISION OF HIGH RISK PREGNANCY IN THIRD TRIMESTER: Primary | ICD-10-CM

## 2022-09-12 NOTE — PROGRESS NOTES
OB FOLLOW UP      Chief Complaint   Patient presents with   • Routine Prenatal Visit     Subjective:   • Rash on breast, itchy, blistered.  No exposures.  No other areas.  Started <1week ago, started tiny pinpoint blisters.      Objective:  /72   Wt 83 kg (183 lb)   LMP 02/03/2022   BMI 31.41 kg/m²  5.443 kg (12 lb)  Uterine Size: not examined, US today   Left breast inner at 0900 areola area erythematous dried area, no active blisters    Physical Exam  Chest:             FHT: 110-160 BPM    See OB flow for edema, cvx exam if performed, and Upro/Uglu    Assessment and Plan:  31w4d  Reassuring pregnancy progress.  Questions answered.  Diagnoses and all orders for this visit:    1. High-risk pregnancy in third trimester (Primary)  Overview:  [place] finalized: 11/10/22 by LMP and 13week US    Genetic testing:  Declined all at initial OB OV.  NIPS neg XX    COVID: Declines  Tdap:  Vaccinated  Flu:  9/6/2022 recommended    ?Sterilization: No    MFM US: 7/7/22 15%ile, prominent aorta on fetus, breech  US: 7/27/22 MFM 9%ile, prominent aorta  US: 8/25/22 MFM 2#4oz, <3%ile, AC <3%ile, nl dopplers.  VTX  US: 9/13/2022 BPP 8/8    PROBLEM LIST/PLAN:  IUGR- MFM rec weekly dopplers, biweek BPP alt MFM (Thu) and [facility]HH (qMon NST/BPP), US growth 3weeks, fetal echo pt rescheduled to 9/19  `PFO (pt states was ASD) 6/8/2022: Cardiology visit completed Dr. Vincent.  No anticipated cardiac issues.  No further recommendations or follow-up recommended.        Orders:  -     POC Urinalysis Dipstick    2. Rash  Comments:  By hx cw possible HSV.  Handwashing, contact precautions.  Due to clinical suspicion by hx, will draw serum HSV I/II if negative.  Orders:  -     Herpes Simplex Virus Culture - Swab, Chest, Left    Counseling:    • OB precautions, leaking, VB, shelton rivera vs PTL/Labor  • FKC  • Continue PNV.  Importance of healthy eating and staying active.    Return in about 1 week (around 9/20/2022) for FU OB q1wk to [place]/Delivery w  BPP.            Cleo Riley,   09/13/2022    [facility] [place] [facility]  [address] [place] [address]  Dept: [phone]  Dept Fax: [phone]  Loc: [phone]  Loc Fax: [phone]

## 2022-09-13 ENCOUNTER — HOSPITAL ENCOUNTER (OUTPATIENT)
Dept: LABOR AND DELIVERY | Facility: HOSPITAL | Age: 22
Discharge: HOME | End: 2022-09-13

## 2022-09-13 ENCOUNTER — ROUTINE PRENATAL (OUTPATIENT)
Dept: OBSTETRICS AND GYNECOLOGY | Facility: CLINIC | Age: 22
End: 2022-09-13

## 2022-09-13 ENCOUNTER — HOSPITAL ENCOUNTER (OUTPATIENT)
Facility: HOSPITAL | Age: 22
Discharge: HOME | End: 2022-09-13
Attending: OBSTETRICS & GYNECOLOGY | Admitting: OBSTETRICS & GYNECOLOGY

## 2022-09-13 VITALS
DIASTOLIC BLOOD PRESSURE: 73 MMHG | TEMPERATURE: 98.4 F | SYSTOLIC BLOOD PRESSURE: 117 MMHG | OXYGEN SATURATION: 98 % | RESPIRATION RATE: 16 BRPM | HEART RATE: 93 BPM

## 2022-09-13 VITALS — WEIGHT: 183 LBS | SYSTOLIC BLOOD PRESSURE: 116 MMHG | BODY MASS INDEX: 31.41 KG/M2 | DIASTOLIC BLOOD PRESSURE: 72 MMHG

## 2022-09-13 DIAGNOSIS — O09.93 HIGH-RISK PREGNANCY IN THIRD TRIMESTER: Primary | ICD-10-CM

## 2022-09-13 DIAGNOSIS — R21 RASH: ICD-10-CM

## 2022-09-13 LAB
GLUCOSE UR STRIP-MCNC: NEGATIVE MG/DL
PROT UR STRIP-MCNC: NEGATIVE MG/DL

## 2022-09-13 PROCEDURE — 59025 FETAL NON-STRESS TEST: CPT

## 2022-09-13 PROCEDURE — 87255 GENET VIRUS ISOLATE HSV: CPT | Performed by: OBSTETRICS & GYNECOLOGY

## 2022-09-13 PROCEDURE — 0502F SUBSEQUENT PRENATAL CARE: CPT | Performed by: OBSTETRICS & GYNECOLOGY

## 2022-09-13 NOTE — NON STRESS TEST
Obstetrical Non-stress Test Interpretation     Name:  Rosa Reid  MRN: [id number]    21 y.o. female  at 31w5d    Indication: IUGR    Fetal Assessment  Fetal Movement: active  Fetal HR Assessment Method: external  Fetal HR (beats/min): 135  Fetal HR Baseline: normal range  Fetal HR Variability: moderate (amplitude range 6 to 25 bpm)  Fetal HR Accelerations: greater than/equal to 10 bpm (32 wks gest or less), lasts at least 10 seconds (32 wks gest or less)  Fetal HR Decelerations: absent  Sinusoidal Pattern Present: absent  Fetal HR Tracing Category: Category I    /73 (BP Location: Left arm, Patient Position: Sitting)   Pulse 93   Resp 16   LMP 2022   SpO2 98%     Reason for test: Intrauterine growth restriction  Date of Test: 2022  Time frame of test:   RN NST Interpretation: Reactive      Thalia Esposito RN  2022  16:26 EDT

## 2022-09-13 NOTE — NON STRESS TEST
Obstetrical Non-stress Test Interpretation     Name:  Rosa Reid  MRN: [id number]    21 y.o. female  at 31w5d    Indication: EGA 31W5D;IUGR      Baseline: Normal 110-160 bpm  Variability:   Moderate/Normal (amplitude 6-25 bpm)  Accelerations: Present (<32 weeks) 10 x 10 bpm   Decelerations: Absent   Contractions:  Absent       Impression:  Category I       Mary Hill MD  2022  17:18 EDT

## 2022-09-15 LAB — HSV SPEC CULT: NEGATIVE

## 2022-09-16 DIAGNOSIS — L98.9 SKIN LESION: Primary | ICD-10-CM

## 2022-09-20 ENCOUNTER — HOSPITAL ENCOUNTER (OUTPATIENT)
Facility: HOSPITAL | Age: 22
Discharge: HOME | End: 2022-09-20
Attending: OBSTETRICS & GYNECOLOGY | Admitting: OBSTETRICS & GYNECOLOGY

## 2022-09-20 ENCOUNTER — HOSPITAL ENCOUNTER (OUTPATIENT)
Dept: LABOR AND DELIVERY | Facility: HOSPITAL | Age: 22
Discharge: HOME | End: 2022-09-20

## 2022-09-20 VITALS — SYSTOLIC BLOOD PRESSURE: 130 MMHG | RESPIRATION RATE: 16 BRPM | HEART RATE: 99 BPM | DIASTOLIC BLOOD PRESSURE: 77 MMHG

## 2022-09-20 PROCEDURE — 59025 FETAL NON-STRESS TEST: CPT

## 2022-09-20 NOTE — NON STRESS TEST
Obstetrical Non-stress Test Interpretation     Name:  Rosa Reid  MRN: [id number]    21 y.o. female  at 32w5d    Indication:scheduled NST for IUGR       Fetal Assessment  Fetal Movement: active  Fetal HR Assessment Method: external  Fetal HR (beats/min): 145  Fetal HR Baseline: normal range  Fetal HR Variability: moderate (amplitude range 6 to 25 bpm)  Fetal HR Accelerations: greater than/equal to 15 bpm, lasting at least 15 seconds  Fetal HR Decelerations: absent  Sinusoidal Pattern Present: absent    /77 (BP Location: Right arm, Patient Position: Sitting)   Pulse 99   Resp 16   LMP 2022     Reason for test: Intrauterine growth restriction  Date of Test: 2022  Time frame of test: 0331-1133  RN NST Interpretation: Reactive      Nereida Hernandez RN  2022  14:40 EDT

## 2022-09-21 ENCOUNTER — ROUTINE PRENATAL (OUTPATIENT)
Dept: OBSTETRICS AND GYNECOLOGY | Facility: CLINIC | Age: 22
End: 2022-09-21

## 2022-09-21 VITALS — WEIGHT: 184.8 LBS | SYSTOLIC BLOOD PRESSURE: 124 MMHG | DIASTOLIC BLOOD PRESSURE: 85 MMHG | BODY MASS INDEX: 31.72 KG/M2

## 2022-09-21 DIAGNOSIS — O09.93 HIGH-RISK PREGNANCY IN THIRD TRIMESTER: ICD-10-CM

## 2022-09-21 DIAGNOSIS — L98.9 SKIN LESION: ICD-10-CM

## 2022-09-21 DIAGNOSIS — O09.93 SUPERVISION OF HIGH RISK PREGNANCY IN THIRD TRIMESTER: Primary | ICD-10-CM

## 2022-09-21 DIAGNOSIS — Z34.80 SUPERVISION OF OTHER NORMAL PREGNANCY, ANTEPARTUM: ICD-10-CM

## 2022-09-21 LAB
GLUCOSE UR STRIP-MCNC: NEGATIVE MG/DL
PROT UR STRIP-MCNC: NEGATIVE MG/DL

## 2022-09-21 PROCEDURE — 86695 HERPES SIMPLEX TYPE 1 TEST: CPT | Performed by: OBSTETRICS & GYNECOLOGY

## 2022-09-21 PROCEDURE — 86694 HERPES SIMPLEX NES ANTBDY: CPT | Performed by: OBSTETRICS & GYNECOLOGY

## 2022-09-21 PROCEDURE — 86696 HERPES SIMPLEX TYPE 2 TEST: CPT | Performed by: OBSTETRICS & GYNECOLOGY

## 2022-09-21 PROCEDURE — 0502F SUBSEQUENT PRENATAL CARE: CPT | Performed by: OBSTETRICS & GYNECOLOGY

## 2022-09-21 NOTE — PROGRESS NOTES
Chief Complaint:  Scheduled OB visit    HPI: 21 y.o.  at 32w6d   Left breast areolar lesion is dry.  But itching.  Recommend shaving cream with aloe possibly for comfort.  Will have labs drawn for HSV today.  Discussed possibility of mild shingles outbreak.    Patient reports good baby movement    Vitals:    22 1555   BP: 124/85   Weight: 83.8 kg (184 lb 12.8 oz)       See OB flowsheet also for pregnancy related data.    A/P  Intrauterine pregnancy at 32w6d   Diagnoses and all orders for this visit:    1. Supervision of high risk pregnancy in third trimester (Primary)    2. Supervision of other normal pregnancy, antepartum  -     POC Urinalysis Dipstick    3. High-risk pregnancy in third trimester  Overview:  [place] finalized: 11/10/22 by LMP and 13week US    Genetic testing:  Declined all at initial OB OV.  NIPS neg XX    COVID: Declines  Tdap:  Vaccinated  Flu:  2022 recommended    ?Sterilization: No    MFM US: 22 15%ile, prominent aorta on fetus, breech  US: 22 MFM 9%ile, prominent aorta  US: 22 MFM 2#4oz, <3%ile, AC <3%ile, nl dopplers.  VTX  US: 9/15/22 MFM 3#11oz, 15%, AC 8%    PROBLEM LIST/PLAN:  IUGR- MFM rec weekly dopplers, biweek BPP alt MFM (Thu) and [facility] (qMon NST/BPP), US growth 3weeks, fetal echo pt rescheduled to   `PFO (pt states was ASD) 2022: Cardiology visit completed Dr. Vincent.  No anticipated cardiac issues.  No further recommendations or follow-up recommended.          Continue prenatal vitamins.  Encouraged fetal kick counts, 10 movements in 2 hours every day.  To labor and delivery if lack fetal movement    PLAN:   Return in about 1 week (around 2022).    Eloy Murrell Sr., MD  2022 16:13 EDT

## 2022-09-22 PROBLEM — Q21.0 VSD (VENTRICULAR SEPTAL DEFECT): Status: ACTIVE | Noted: 2022-09-22

## 2022-09-23 PROBLEM — B00.9 HSV-1 INFECTION: Status: ACTIVE | Noted: 2022-09-23

## 2022-09-23 LAB
HSV1 IGG SER IA-ACNC: 1.31 INDEX (ref 0–0.9)
HSV1+2 IGM SER IA-ACNC: <0.91 RATIO (ref 0–0.9)
HSV2 IGG SER IA-ACNC: <0.91 INDEX (ref 0–0.9)

## 2022-09-23 RX ORDER — VALACYCLOVIR HYDROCHLORIDE 1 G/1
1000 TABLET, FILM COATED ORAL DAILY
Qty: 90 TABLET | Refills: 4 | Status: SHIPPED | OUTPATIENT
Start: 2022-09-23 | End: 2022-10-28 | Stop reason: HOSPADM

## 2022-09-23 RX ORDER — VALACYCLOVIR HYDROCHLORIDE 1 G/1
1000 TABLET, FILM COATED ORAL 2 TIMES DAILY PRN
Qty: 36 TABLET | Refills: 4 | Status: SHIPPED | OUTPATIENT
Start: 2022-09-23 | End: 2022-09-26

## 2022-09-23 NOTE — PROGRESS NOTES
OB FOLLOW UP      Chief Complaint   Patient presents with   • Routine Prenatal Visit     Subjective:   • Some swelling legs, occasionally eyes puffy.  Had HA, gone on it's own.   No RUQ pain.    Objective:  /74   Wt 85.3 kg (188 lb)   LMP 02/03/2022   BMI 32.27 kg/m²  7.711 kg (17 lb)  Uterine Size: not examined, US today  FHT: 110-160 BPM  Bilat extremities no evidence of DVT    See OB flow for edema, cvx exam if performed, and Upro/Uglu    Assessment and Plan:  33w1d  Reassuring pregnancy progress.  Questions answered.  Diagnoses and all orders for this visit:    1. Supervision of high risk pregnancy in third trimester (Primary)  -     POC Urinalysis Dipstick    2. HSV-1 infection/ serum positive    3. High-risk pregnancy in third trimester  Overview:  [place] finalized: 11/10/22 by LMP and 13week US    Genetic testing:  Declined all at initial OB OV.  NIPS neg XX    COVID: Declines  Tdap:  Vaccinated  Flu:  Declines    ?Sterilization: No    MFM US: 7/7/22 15%ile, prominent aorta on fetus, breech  US: 7/27/22 MFM 9%ile, prominent aorta  US: 8/25/22 MFM 2#4oz, <3%ile, AC <3%ile, nl dopplers.  VTX  US: 9/15/22 MFM 3#11oz, 15%, AC 8%  Fetal echo 9/22/22: small VSD    PROBLEM LIST/PLAN:  -IUGR   MFM rec weekly dopplers, biweek BPP alt MFM (Thu) and [facility] (qMon NST/BPP)- 9/26/2022 pt states [facility] and MFM can't do week of 3Oct, will schedule 3Oct and 6Oct w [facility]   US growth 3weeks   26Oct 37+6 AP IOL, plan to del after MN at 38weeks  -Fetus w VSD  -Pt w PFO (pt states was ASD) 6/8/2022: Cardiology visit completed Dr. Vincent.  No anticipated cardiac issues.  No further recommendations or follow-up recommended.  -HSV I serum positive, continue valtrex 1000mg          4. Candidiasis, vagina  -     fluconazole (Diflucan) 150 MG tablet; Take one pill and repeat in 72hrs.  Dispense: 2 tablet; Refill: 0    Counseling:    • OB precautions, leaking, VB, shelton rivera vs PTL/Labor  • FKC  • HTN precautions, HA, vision change,  RUQ/epigastric pain, edema  • Lower Extremity EDEMA- We discussed conservative options to include dietary changes, elevation, and compression stockings.  DVT signs and symptoms reviewed.  To ER immediately if any signs or symptoms of DVT.  • IOL scheduled  • Continue PNV.  Importance of healthy eating and staying active.    Return in about 2 weeks (around 10/10/2022) for FU OB q2wks to 36weeks then weekly to [place], COMPRESSION stockings Rx.            Cleo Riley,   09/26/2022    [facility] [place] [facility]  [facility] [place] [address]  Dept: [phone]  Dept Fax: [phone]  Loc: [phone]  Loc Fax: [phone]

## 2022-09-26 ENCOUNTER — ROUTINE PRENATAL (OUTPATIENT)
Dept: OBSTETRICS AND GYNECOLOGY | Facility: CLINIC | Age: 22
End: 2022-09-26

## 2022-09-26 ENCOUNTER — HOSPITAL ENCOUNTER (OUTPATIENT)
Dept: LABOR AND DELIVERY | Facility: HOSPITAL | Age: 22
Discharge: HOME | End: 2022-09-26

## 2022-09-26 ENCOUNTER — HOSPITAL ENCOUNTER (OUTPATIENT)
Facility: HOSPITAL | Age: 22
Discharge: HOME | End: 2022-09-26
Attending: STUDENT IN AN ORGANIZED HEALTH CARE EDUCATION/TRAINING PROGRAM | Admitting: STUDENT IN AN ORGANIZED HEALTH CARE EDUCATION/TRAINING PROGRAM

## 2022-09-26 VITALS — SYSTOLIC BLOOD PRESSURE: 122 MMHG | BODY MASS INDEX: 32.27 KG/M2 | DIASTOLIC BLOOD PRESSURE: 74 MMHG | WEIGHT: 188 LBS

## 2022-09-26 VITALS — SYSTOLIC BLOOD PRESSURE: 116 MMHG | DIASTOLIC BLOOD PRESSURE: 68 MMHG | HEART RATE: 92 BPM | RESPIRATION RATE: 16 BRPM

## 2022-09-26 DIAGNOSIS — B00.9 HSV-1 INFECTION: ICD-10-CM

## 2022-09-26 DIAGNOSIS — O09.93 SUPERVISION OF HIGH RISK PREGNANCY IN THIRD TRIMESTER: Primary | ICD-10-CM

## 2022-09-26 DIAGNOSIS — B37.31 CANDIDIASIS, VAGINA: ICD-10-CM

## 2022-09-26 DIAGNOSIS — O09.93 HIGH-RISK PREGNANCY IN THIRD TRIMESTER: ICD-10-CM

## 2022-09-26 LAB
GLUCOSE UR STRIP-MCNC: NEGATIVE MG/DL
PROT UR STRIP-MCNC: NEGATIVE MG/DL

## 2022-09-26 PROCEDURE — 59025 FETAL NON-STRESS TEST: CPT | Performed by: STUDENT IN AN ORGANIZED HEALTH CARE EDUCATION/TRAINING PROGRAM

## 2022-09-26 PROCEDURE — 0502F SUBSEQUENT PRENATAL CARE: CPT | Performed by: OBSTETRICS & GYNECOLOGY

## 2022-09-26 PROCEDURE — 59025 FETAL NON-STRESS TEST: CPT

## 2022-09-26 RX ORDER — FLUCONAZOLE 150 MG/1
TABLET ORAL
Qty: 2 TABLET | Refills: 0 | Status: SHIPPED | OUTPATIENT
Start: 2022-09-26 | End: 2022-10-24

## 2022-09-26 NOTE — NON STRESS TEST
Obstetrical Non-stress Test Interpretation     Name:  Rosa Reid  MRN: [id number]    21 y.o. female  at 33w4d    Indication: IUGR      Fetal Movement: active  Fetal HR Assessment Method: external  Fetal HR (beats/min): 135  Fetal HR Baseline: normal range  Fetal HR Variability: moderate (amplitude range 6 to 25 bpm)  Fetal HR Accelerations: episodic, greater than/equal to 15 bpm, lasting at least 15 seconds  Fetal HR Decelerations: absent  Sinusoidal Pattern Present: absent  Fetal HR Tracing Category: Category I    /68 (BP Location: Right arm, Patient Position: Sitting)   Pulse 92   Resp 16   LMP 2022     Reason for test: Intrauterine growth restriction  Date of Test: 2022  Time frame of test: 7813-4121  RN NST Interpretation: Reactive      Shruthi Ortez RN  2022  16:31 EDT

## 2022-10-03 ENCOUNTER — HOSPITAL ENCOUNTER (OUTPATIENT)
Dept: LABOR AND DELIVERY | Facility: HOSPITAL | Age: 22
Discharge: HOME | End: 2022-10-03

## 2022-10-03 ENCOUNTER — HOSPITAL ENCOUNTER (OUTPATIENT)
Facility: HOSPITAL | Age: 22
Discharge: HOME | End: 2022-10-03
Attending: STUDENT IN AN ORGANIZED HEALTH CARE EDUCATION/TRAINING PROGRAM | Admitting: STUDENT IN AN ORGANIZED HEALTH CARE EDUCATION/TRAINING PROGRAM

## 2022-10-03 VITALS
SYSTOLIC BLOOD PRESSURE: 128 MMHG | HEIGHT: 64 IN | RESPIRATION RATE: 17 BRPM | TEMPERATURE: 98.8 F | OXYGEN SATURATION: 98 % | HEART RATE: 107 BPM | BODY MASS INDEX: 32.1 KG/M2 | WEIGHT: 188 LBS | DIASTOLIC BLOOD PRESSURE: 79 MMHG

## 2022-10-03 PROCEDURE — 59025 FETAL NON-STRESS TEST: CPT | Performed by: STUDENT IN AN ORGANIZED HEALTH CARE EDUCATION/TRAINING PROGRAM

## 2022-10-03 PROCEDURE — 59025 FETAL NON-STRESS TEST: CPT

## 2022-10-03 NOTE — NON STRESS TEST
"Obstetrical Non-stress Test Interpretation     Name:  Rosa Reid  MRN: [id number]    22 y.o. female  at 34w4d    Indication: IUGR      Fetal Assessment  Fetal Movement: active  Fetal HR Assessment Method: external  Fetal HR (beats/min): 145  Fetal HR Baseline: normal range  Fetal HR Variability: moderate (amplitude range 6 to 25 bpm)  Fetal HR Accelerations: greater than/equal to 15 bpm, lasting at least 15 seconds  Fetal HR Decelerations: absent  Sinusoidal Pattern Present: absent  Fetal HR Tracing Category: Category I    /79   Pulse 107   Temp 98.8 °F (37.1 °C) (Oral)   Resp 17   Ht 162.6 cm (64\")   Wt 85.3 kg (188 lb)   LMP 2022   SpO2 98%   BMI 32.27 kg/m²     Reason for test: Intrauterine growth restriction  Date of Test: 10/3/2022  Time frame of test: 9570-0218  RN NST Interpretation: Gayle Conti RN  10/3/2022  16:28 EDT  "

## 2022-10-04 ENCOUNTER — APPOINTMENT (OUTPATIENT)
Dept: ULTRASOUND IMAGING | Facility: HOSPITAL | Age: 22
End: 2022-10-04

## 2022-10-05 DIAGNOSIS — O36.5931 IUGR (INTRAUTERINE GROWTH RESTRICTION) AFFECTING CARE OF MOTHER, THIRD TRIMESTER, FETUS 1: Primary | ICD-10-CM

## 2022-10-05 NOTE — PROGRESS NOTES
OB FOLLOW UP      Chief Complaint   Patient presents with   • Routine Prenatal Visit     Subjective:   • Swelling for 2 weeks, seems to have right hand and leg more swollen.   For 2-3 weeks.  Persisting.  Restless legs at night.  • HR running 115 at times for a week, intermittently.  No CP. Occas mild SOA w elev HR.  No CP or SOA today, even worked today.  No caffeine except occas one glass tea.  Mother w [employer]    Objective:  /68   Wt 87.1 kg (192 lb)   LMP 02/03/2022   BMI 32.96 kg/m²  9.526 kg (21 lb)  Uterine Size: not examined, US today  FHT: 110-160 BPM  CV-RRR HR 90  Resp- CTA  Bilat LE, mild to +1 edema, equal.  Neg homans.  No signs DVT  Bilat UE, no evidence of edema    See OB flow for edema, cvx exam if performed, and Upro/Uglu    Assessment and Plan:  34w6d  Reassuring pregnancy progress.  Questions answered.  Diagnoses and all orders for this visit:    1. High-risk pregnancy in third trimester (Primary)  Overview:  [place] finalized: 11/10/22 by LMP and 13week US    NIPS neg XX    COVID: Declines  Tdap:  Vaccinated  Flu:  Declines    ?Sterilization: No    MFM US: 7/7/22 15%ile, prominent aorta on fetus, breech  US: 7/27/22 MFM 9%ile, prominent aorta  US: 8/25/22 MFM 2#4oz, <3%ile, AC <3%ile, nl dopplers.  VTX  US: 9/15/22 MFM 3#11oz, 15%, AC 8%  Fetal echo 9/22/22: small VSD    PROBLEM LIST/PLAN:  -IUGR   MFM rec weekly dopplers, biweek BPP alt MFM (Thu) and [facility] (qMon NST/BPP)   US growth 3weeks scheduled w MFM week of 10oct   26Oct 37+6 AP IOL, plan to del after MN at 38weeks  -Fetus w VSD  -Pt w PFO (pt states was ASD) 6/8/2022: Cardiology visit completed Dr. Vincent.  No anticipated cardiac issues.  No further recommendations or follow-up recommended.  -HSV I serum positive, continue valtrex 1000mg        Orders:  -     POC Urinalysis Dipstick    2. Restless leg syndrome  -     T4, Free  -     TSH  -     CBC (No Diff)  -     ferrous sulfate 325 (65 FE) MG tablet; Take 1 tablet by mouth Every  Other Day.  Dispense: 30 tablet; Refill: 2    Counseling:    • OB precautions, leaking, VB, shelton rivera vs PTL/Labor  • FKC  • To ER immediately for chest pain or shortness of air or persistent elevated heart rate.  Discussed restless leg syndrome and most recent ACOG recommendations for prevention.  • Continue PNV.  Importance of healthy eating and staying active.    Return in about 1 week (around 10/17/2022) for FU OB q1wk to [place]/Delivery.            Cleo Riley,   10/10/2022    [facility] [place] [facility]  [address] [place] [address]  Dept: [phone]  Dept Fax: [phone]  Loc: [phone]  Loc Fax: [phone]

## 2022-10-07 DIAGNOSIS — O36.5931 IUGR (INTRAUTERINE GROWTH RESTRICTION) AFFECTING CARE OF MOTHER, THIRD TRIMESTER, FETUS 1: Primary | ICD-10-CM

## 2022-10-10 ENCOUNTER — HOSPITAL ENCOUNTER (OUTPATIENT)
Dept: LABOR AND DELIVERY | Facility: HOSPITAL | Age: 22
Discharge: HOME | End: 2022-10-10

## 2022-10-10 ENCOUNTER — ROUTINE PRENATAL (OUTPATIENT)
Dept: OBSTETRICS AND GYNECOLOGY | Facility: CLINIC | Age: 22
End: 2022-10-10

## 2022-10-10 ENCOUNTER — HOSPITAL ENCOUNTER (OUTPATIENT)
Facility: HOSPITAL | Age: 22
End: 2022-10-10
Attending: STUDENT IN AN ORGANIZED HEALTH CARE EDUCATION/TRAINING PROGRAM | Admitting: STUDENT IN AN ORGANIZED HEALTH CARE EDUCATION/TRAINING PROGRAM

## 2022-10-10 ENCOUNTER — HOSPITAL ENCOUNTER (OUTPATIENT)
Facility: HOSPITAL | Age: 22
Discharge: HOME | End: 2022-10-10
Attending: STUDENT IN AN ORGANIZED HEALTH CARE EDUCATION/TRAINING PROGRAM | Admitting: STUDENT IN AN ORGANIZED HEALTH CARE EDUCATION/TRAINING PROGRAM

## 2022-10-10 VITALS — SYSTOLIC BLOOD PRESSURE: 136 MMHG | HEART RATE: 90 BPM | DIASTOLIC BLOOD PRESSURE: 78 MMHG

## 2022-10-10 VITALS — BODY MASS INDEX: 32.96 KG/M2 | WEIGHT: 192 LBS | DIASTOLIC BLOOD PRESSURE: 68 MMHG | SYSTOLIC BLOOD PRESSURE: 118 MMHG

## 2022-10-10 DIAGNOSIS — O09.93 HIGH-RISK PREGNANCY IN THIRD TRIMESTER: Primary | ICD-10-CM

## 2022-10-10 DIAGNOSIS — G25.81 RESTLESS LEG SYNDROME: ICD-10-CM

## 2022-10-10 LAB
DEPRECATED RDW RBC AUTO: 39.4 FL (ref 37–54)
ERYTHROCYTE [DISTWIDTH] IN BLOOD BY AUTOMATED COUNT: 13.7 % (ref 12.3–15.4)
GLUCOSE UR STRIP-MCNC: NEGATIVE MG/DL
HCT VFR BLD AUTO: 34.8 % (ref 34–46.6)
HGB BLD-MCNC: 10.8 G/DL (ref 12–15.9)
MCH RBC QN AUTO: 24.7 PG (ref 26.6–33)
MCHC RBC AUTO-ENTMCNC: 31 G/DL (ref 31.5–35.7)
MCV RBC AUTO: 79.6 FL (ref 79–97)
PLATELET # BLD AUTO: 167 10*3/MM3 (ref 140–450)
PMV BLD AUTO: 12.1 FL (ref 6–12)
PROT UR STRIP-MCNC: NEGATIVE MG/DL
RBC # BLD AUTO: 4.37 10*6/MM3 (ref 3.77–5.28)
WBC NRBC COR # BLD: 9.95 10*3/MM3 (ref 3.4–10.8)

## 2022-10-10 PROCEDURE — 59025 FETAL NON-STRESS TEST: CPT

## 2022-10-10 PROCEDURE — G0463 HOSPITAL OUTPT CLINIC VISIT: HCPCS

## 2022-10-10 PROCEDURE — 0502F SUBSEQUENT PRENATAL CARE: CPT | Performed by: OBSTETRICS & GYNECOLOGY

## 2022-10-10 PROCEDURE — 84443 ASSAY THYROID STIM HORMONE: CPT | Performed by: OBSTETRICS & GYNECOLOGY

## 2022-10-10 PROCEDURE — 84439 ASSAY OF FREE THYROXINE: CPT | Performed by: OBSTETRICS & GYNECOLOGY

## 2022-10-10 PROCEDURE — 59025 FETAL NON-STRESS TEST: CPT | Performed by: STUDENT IN AN ORGANIZED HEALTH CARE EDUCATION/TRAINING PROGRAM

## 2022-10-10 PROCEDURE — 85027 COMPLETE CBC AUTOMATED: CPT | Performed by: OBSTETRICS & GYNECOLOGY

## 2022-10-10 RX ORDER — FERROUS SULFATE 325(65) MG
325 TABLET ORAL EVERY OTHER DAY
Qty: 30 TABLET | Refills: 2 | Status: SHIPPED | OUTPATIENT
Start: 2022-10-10 | End: 2022-10-28 | Stop reason: HOSPADM

## 2022-10-10 NOTE — NON STRESS TEST
Obstetrical Non-stress Test Interpretation     Name:  Rosa Reid  MRN: [id number]    22 y.o. female  at 35w4d    Indication: IUGR      Fetal Assessment  Fetal Movement: active  Fetal HR Assessment Method: external  Fetal HR (beats/min): 135  Fetal HR Baseline: normal range  Fetal HR Variability: moderate (amplitude range 6 to 25 bpm)  Fetal HR Accelerations: greater than/equal to 15 bpm, lasting at least 15 seconds  Fetal HR Decelerations: absent  Sinusoidal Pattern Present: absent  Fetal HR Tracing Category: Category I    /78   Pulse 90   LMP 2022     Reason for test: Intrauterine growth restriction  Date of Test: 10/10/2022  Time frame of test:   RN NST Interpretation: Gayle Booth RN  10/10/2022  17:22 EDT

## 2022-10-11 PROBLEM — R79.89 ABNORMAL SERUM THYROXINE (T4) LEVEL: Status: ACTIVE | Noted: 2022-10-11

## 2022-10-11 LAB
T4 FREE SERPL-MCNC: 0.77 NG/DL (ref 0.93–1.7)
TSH SERPL DL<=0.05 MIU/L-ACNC: 1.35 UIU/ML (ref 0.27–4.2)

## 2022-10-16 PROBLEM — R42 DIZZINESS: Status: RESOLVED | Noted: 2022-07-18 | Resolved: 2022-10-16

## 2022-10-16 PROBLEM — R23.4 PEELING SKIN: Status: RESOLVED | Noted: 2022-07-18 | Resolved: 2022-10-16

## 2022-10-16 NOTE — PROGRESS NOTES
Routine Prenatal Visit     Subjective  Rosa Reid is a 22 y.o.  at 36w4d here for her routine OB visit.   She is taking her prenatal vitamins.Reports no loss of fluid or vaginal bleeding. Patient doing well without any complaints. Pregnancy complicated by:     Patient Active Problem List   Diagnosis   • High-risk pregnancy in third trimester   • Patent foramen ovale   • IUGR   • Vitamin D deficiency   • VSD (ventricular septal defect) Fetus   • HSV-1 infection/ serum positive   • Restless leg syndrome   • Abnormal serum thyroxine (T4) level     [place] finalized: 11/10/22 by LMP and 13week US     NIPS neg XX     COVID: Declines  Tdap:  Vaccinated  Flu:  Declines     ?Sterilization: No     MFM US: 22 15%ile, prominent aorta on fetus, breech  US: 22 MFM 9%ile, prominent aorta  US: 22 MFM 2#4oz, <3%ile, AC <3%ile, nl dopplers.  VTX  US: 9/15/22 MFM 3#11oz, 15%, AC 8%  Fetal echo 22: small VSD      OB History    Para Term  AB Living   2 1 1     1   SAB IAB Ectopic Molar Multiple Live Births             1      # Outcome Date GA Lbr Alo/2nd Weight Sex Delivery Anes PTL Lv   2 Current            1 Term 2020 40w0d  2920 g (6 lb 7 oz) F Vaginal unsp   JEAN-PAUL           ROS:   General ROS: negative for - chills or fatigue  Respiratory ROS: negative for - cough or hemoptysis  Cardiovascular ROS: negative for - chest pain or dyspnea on exertion  Genito-Urinary ROS: negative for  change in urinary stream, vaginal discharge   Musculoskeletal ROS: negative for - gait disturbance or joint pain  Dermatological ROS: negative for acne,  dry skin or itching    Objective  Physical Exam:   Vitals:    10/17/22 0936   BP: 130/89       Uterine Size: size equals dates  FHT: 110-160 BPM    General appearance - alert, well appearing, and in no distress  Mental status - alert, oriented to person, place, and time  Abdomen- Soft, Gravid uterus, non-tender to palpation  Musculoskeletal: negative for - gait  disturbance or joint pain  Extremeties: negative swelling or cyanosis   Dermatological: negative rashes or skin lesions   Pelvic exam: GBS RV swab performed today, FT/Thick/mid position     Assessment/Plan:   Diagnoses and all orders for this visit:    1. High-risk pregnancy in third trimester (Primary)  -     POC Urinalysis Dipstick    2. IUGR (intrauterine growth restriction) affecting care of mother, third trimester, fetus 1    3. Restless leg syndrome    4. Anomaly of heart of fetus affecting pregnancy, antepartum, single or unspecified fetus    5. HSV-1 infection/ serum positive    6. 36 weeks gestation of pregnancy  -     Group B Streptococcus Culture - Swab, Vaginal/Rectum; Future  -     Group B Streptococcus Culture - Swab, Vaginal/Rectum        Counseling:   OB precautions, leaking, VB, shelton rivera vs PTL/Labor  HTN precautions, HA, vision change, RUQ/epigastric pain, edema  Round Ligament Pain:  The uterus has several ligaments which provide support and keep the uterus in place. As the  uterus grows these ligaments are pulled and stretched which often causes sharp stabbing like pain in the inguinal area.   You may find a pregnancy support band helpful. Changing positions may also help. Yoga is a great way to cope with round ligament and low back pain in pregnancy.    Massage may also help with low back pain   Things to Consider at this Point in your Pregnancy:  Some women experience swelling in their feet during pregnancy. Compression stockings may help  Drink plenty of water and stay active   Make sure you are eating frequent small meals, nuts are a wonderful snack to keep with you            Return in about 1 week (around 10/24/2022) for Routine OB visit, BPP.      We have gone over prenatal care to include the timing and content of visits. I informed her how to contact the office and/or on call person in the event of any problems and encouraged her to do so when she feels it is necessary.  We then  spent time answering her questions which she indicated were answered to her satisfaction.    Lala Krishnan DO  10/17/2022 09:51 EDT

## 2022-10-17 ENCOUNTER — ROUTINE PRENATAL (OUTPATIENT)
Dept: OBSTETRICS AND GYNECOLOGY | Facility: CLINIC | Age: 22
End: 2022-10-17

## 2022-10-17 VITALS — WEIGHT: 196.2 LBS | DIASTOLIC BLOOD PRESSURE: 89 MMHG | SYSTOLIC BLOOD PRESSURE: 130 MMHG | BODY MASS INDEX: 33.68 KG/M2

## 2022-10-17 DIAGNOSIS — O09.93 HIGH-RISK PREGNANCY IN THIRD TRIMESTER: Primary | ICD-10-CM

## 2022-10-17 DIAGNOSIS — Z3A.36 36 WEEKS GESTATION OF PREGNANCY: ICD-10-CM

## 2022-10-17 DIAGNOSIS — G25.81 RESTLESS LEG SYNDROME: ICD-10-CM

## 2022-10-17 DIAGNOSIS — B00.9 HSV-1 INFECTION: ICD-10-CM

## 2022-10-17 DIAGNOSIS — O35.BXX0 ANOMALY OF HEART OF FETUS AFFECTING PREGNANCY, ANTEPARTUM, SINGLE OR UNSPECIFIED FETUS: ICD-10-CM

## 2022-10-17 DIAGNOSIS — O36.5931 IUGR (INTRAUTERINE GROWTH RESTRICTION) AFFECTING CARE OF MOTHER, THIRD TRIMESTER, FETUS 1: ICD-10-CM

## 2022-10-17 LAB
GLUCOSE UR STRIP-MCNC: NEGATIVE MG/DL
PROT UR STRIP-MCNC: NEGATIVE MG/DL

## 2022-10-17 PROCEDURE — 0502F SUBSEQUENT PRENATAL CARE: CPT | Performed by: OBSTETRICS & GYNECOLOGY

## 2022-10-17 PROCEDURE — 87081 CULTURE SCREEN ONLY: CPT | Performed by: OBSTETRICS & GYNECOLOGY

## 2022-10-19 LAB — BACTERIA SPEC AEROBE CULT: NORMAL

## 2022-10-21 NOTE — PROGRESS NOTES
OB FOLLOW UP      Chief Complaint   Patient presents with   • Routine Prenatal Visit     Subjective:   • Irreg cxns    Objective:  /82   Wt 88.9 kg (196 lb)   LMP 02/03/2022   BMI 33.64 kg/m²  11.3 kg (25 lb)  Uterine Size: not examined, US today  FHT: 110-160 BPM    See OB flow for edema, cvx exam if performed, and Upro/Uglu    Assessment and Plan:  37w1d  Reassuring pregnancy progress.  Questions answered.  Diagnoses and all orders for this visit:    1. High-risk pregnancy in third trimester (Primary)  Overview:  [place] finalized: 11/10/22 by LMP and 13week US    NIPS neg XX    COVID: Declines  Tdap:  Vaccinated  Flu:  Declines    ?Sterilization: No    MFM US: 7/7/22 15%ile, prominent aorta on fetus, breech  US: 7/27/22 MFM 9%ile, prominent aorta  US: 8/25/22 MFM 2#4oz, <3%ile, AC <3%ile, nl dopplers.  VTX  US: 9/15/22 MFM 3#11oz, 15%, AC 8%  Fetal echo 9/22/22: small VSD  US: 10/13/22 MFM 5#5oz, 17%ile, AC 5%    BPP today 8/8      PROBLEM LIST/PLAN:  -IUGR   MFM rec weekly dopplers, biweek BPP alt MFM (Thu) and [facility] (qMon NST/BPP)   10/20 MFM prom echogenic bowel and fetal arrhythmia, suspected PACs to have eval post delivery   26Oct 37+6 AP IOL, plan to del after MN at 38weeks.  Pt notes last MFM US Dr. Mccracken ok w del at 37 1/2- 38weeks.  Will confirm w him.  -Fetus w VSD  -Pt w PFO (pt states was ASD) 6/8/2022: Cardiology visit completed Dr. Vincent.  No anticipated cardiac issues.  No further recommendations or follow-up recommended.  -HSV I serum positive, continue valtrex 1000mg  -10/11/2022 nl TSH, mildly low Ft4- recheck PP      Orders:  -     POC Urinalysis Dipstick    Counseling:    • OB precautions, leaking, VB, shelton rivera vs PTL/Labor  • Fetal kick counts  • I have messaged Dr. Mccracken re 37+6   • IOL plan cytotec and cvx cath, then to pitocin.  Discussed Cytotec not FDA approved, but acog endorsed.  History reviewed and IOL exam completed.   • Continue PNV.  Importance of healthy eating and  staying active.    Return in about 5 weeks (around 11/28/2022) for Postpartum FU.            Cleo Riley,   10/24/2022    [facility] [place] [facility]  [address] [place] [address]  Dept: [phone]  Dept Fax: [phone]  Loc: [phone]  Loc Fax: [phone]

## 2022-10-24 ENCOUNTER — ROUTINE PRENATAL (OUTPATIENT)
Dept: OBSTETRICS AND GYNECOLOGY | Facility: CLINIC | Age: 22
End: 2022-10-24

## 2022-10-24 VITALS — SYSTOLIC BLOOD PRESSURE: 122 MMHG | DIASTOLIC BLOOD PRESSURE: 82 MMHG | BODY MASS INDEX: 33.64 KG/M2 | WEIGHT: 196 LBS

## 2022-10-24 DIAGNOSIS — O09.93 HIGH-RISK PREGNANCY IN THIRD TRIMESTER: Primary | ICD-10-CM

## 2022-10-24 LAB
GLUCOSE UR STRIP-MCNC: NEGATIVE MG/DL
PROT UR STRIP-MCNC: NEGATIVE MG/DL

## 2022-10-24 PROCEDURE — 0502F SUBSEQUENT PRENATAL CARE: CPT | Performed by: OBSTETRICS & GYNECOLOGY

## 2022-10-25 ENCOUNTER — TELEPHONE (OUTPATIENT)
Dept: OBSTETRICS AND GYNECOLOGY | Facility: CLINIC | Age: 22
End: 2022-10-25

## 2022-10-25 ENCOUNTER — ROUTINE PRENATAL (OUTPATIENT)
Dept: OBSTETRICS AND GYNECOLOGY | Facility: CLINIC | Age: 22
End: 2022-10-25

## 2022-10-25 ENCOUNTER — PREP FOR SURGERY (OUTPATIENT)
Dept: OTHER | Facility: HOSPITAL | Age: 22
End: 2022-10-25

## 2022-10-25 VITALS — DIASTOLIC BLOOD PRESSURE: 78 MMHG | WEIGHT: 196 LBS | SYSTOLIC BLOOD PRESSURE: 124 MMHG | BODY MASS INDEX: 33.64 KG/M2

## 2022-10-25 DIAGNOSIS — O36.5990 POOR FETAL GROWTH AFFECTING MANAGEMENT OF MOTHER, ANTEPARTUM, SINGLE OR UNSPECIFIED FETUS: Primary | ICD-10-CM

## 2022-10-25 DIAGNOSIS — O09.93 HIGH-RISK PREGNANCY IN THIRD TRIMESTER: Primary | ICD-10-CM

## 2022-10-25 LAB
GLUCOSE UR STRIP-MCNC: NEGATIVE MG/DL
PROT UR STRIP-MCNC: NEGATIVE MG/DL

## 2022-10-25 PROCEDURE — 0502F SUBSEQUENT PRENATAL CARE: CPT | Performed by: OBSTETRICS & GYNECOLOGY

## 2022-10-25 RX ORDER — LIDOCAINE HYDROCHLORIDE 20 MG/ML
20 INJECTION, SOLUTION INFILTRATION; PERINEURAL AS NEEDED
Status: CANCELLED | OUTPATIENT
Start: 2022-10-25

## 2022-10-25 RX ORDER — FAMOTIDINE 20 MG/1
20 TABLET, FILM COATED ORAL ONCE AS NEEDED
Status: CANCELLED | OUTPATIENT
Start: 2022-10-25

## 2022-10-25 RX ORDER — MINERAL OIL
1 OIL (ML) MISCELLANEOUS AS NEEDED
Status: CANCELLED | OUTPATIENT
Start: 2022-10-25

## 2022-10-25 RX ORDER — HYDROCODONE BITARTRATE AND ACETAMINOPHEN 10; 325 MG/1; MG/1
1 TABLET ORAL EVERY 4 HOURS PRN
Status: CANCELLED | OUTPATIENT
Start: 2022-10-25 | End: 2022-11-01

## 2022-10-25 RX ORDER — CEFAZOLIN SODIUM 2 G/100ML
2 INJECTION, SOLUTION INTRAVENOUS ONCE AS NEEDED
Status: CANCELLED | OUTPATIENT
Start: 2022-10-25 | End: 2022-10-29

## 2022-10-25 RX ORDER — ACETAMINOPHEN 325 MG/1
650 TABLET ORAL ONCE AS NEEDED
Status: CANCELLED | OUTPATIENT
Start: 2022-10-25

## 2022-10-25 RX ORDER — FAMOTIDINE 20 MG/1
20 TABLET, FILM COATED ORAL 2 TIMES DAILY PRN
Status: CANCELLED | OUTPATIENT
Start: 2022-10-25

## 2022-10-25 RX ORDER — TERBUTALINE SULFATE 1 MG/ML
0.25 INJECTION, SOLUTION SUBCUTANEOUS AS NEEDED
Status: CANCELLED | OUTPATIENT
Start: 2022-10-25

## 2022-10-25 RX ORDER — CALCIUM CARBONATE 200(500)MG
2 TABLET,CHEWABLE ORAL 3 TIMES DAILY PRN
Status: CANCELLED | OUTPATIENT
Start: 2022-10-25 | End: 2022-10-27

## 2022-10-25 RX ORDER — METHYLERGONOVINE MALEATE 0.2 MG/ML
200 INJECTION INTRAVENOUS ONCE AS NEEDED
Status: CANCELLED | OUTPATIENT
Start: 2022-10-25

## 2022-10-25 RX ORDER — PROMETHAZINE HYDROCHLORIDE 25 MG/1
25 TABLET ORAL EVERY 6 HOURS PRN
Status: CANCELLED | OUTPATIENT
Start: 2022-10-25

## 2022-10-25 RX ORDER — MAGNESIUM CARB/ALUMINUM HYDROX 105-160MG
30 TABLET,CHEWABLE ORAL ONCE
Status: CANCELLED | OUTPATIENT
Start: 2022-10-25 | End: 2022-10-25

## 2022-10-25 RX ORDER — MISOPROSTOL 100 MCG
25 TABLET ORAL ONCE
Status: CANCELLED | OUTPATIENT
Start: 2022-10-25 | End: 2022-10-25

## 2022-10-25 RX ORDER — SODIUM CHLORIDE 0.9 % (FLUSH) 0.9 %
3 SYRINGE (ML) INJECTION EVERY 12 HOURS SCHEDULED
Status: CANCELLED | OUTPATIENT
Start: 2022-10-25

## 2022-10-25 RX ORDER — OXYTOCIN/0.9 % SODIUM CHLORIDE 30/500 ML
0-4 PLASTIC BAG, INJECTION (ML) INTRAVENOUS CONTINUOUS PRN
Status: CANCELLED | OUTPATIENT
Start: 2022-10-25

## 2022-10-25 RX ORDER — PROMETHAZINE HYDROCHLORIDE 12.5 MG/1
12.5 TABLET ORAL EVERY 6 HOURS PRN
Status: CANCELLED | OUTPATIENT
Start: 2022-10-25

## 2022-10-25 RX ORDER — ONDANSETRON 4 MG/1
4 TABLET, FILM COATED ORAL EVERY 6 HOURS PRN
Status: CANCELLED | OUTPATIENT
Start: 2022-10-25

## 2022-10-25 RX ORDER — ACETAMINOPHEN 325 MG/1
650 TABLET ORAL EVERY 4 HOURS PRN
Status: CANCELLED | OUTPATIENT
Start: 2022-10-25

## 2022-10-25 RX ORDER — TRISODIUM CITRATE DIHYDRATE AND CITRIC ACID MONOHYDRATE 500; 334 MG/5ML; MG/5ML
30 SOLUTION ORAL ONCE AS NEEDED
Status: CANCELLED | OUTPATIENT
Start: 2022-10-25

## 2022-10-25 RX ORDER — FAMOTIDINE 10 MG/ML
20 INJECTION, SOLUTION INTRAVENOUS 2 TIMES DAILY PRN
Status: CANCELLED | OUTPATIENT
Start: 2022-10-25

## 2022-10-25 RX ORDER — HYDROCODONE BITARTRATE AND ACETAMINOPHEN 5; 325 MG/1; MG/1
1 TABLET ORAL EVERY 4 HOURS PRN
Status: CANCELLED | OUTPATIENT
Start: 2022-10-25 | End: 2022-11-01

## 2022-10-25 RX ORDER — LIDOCAINE HYDROCHLORIDE 10 MG/ML
5 INJECTION, SOLUTION EPIDURAL; INFILTRATION; INTRACAUDAL; PERINEURAL AS NEEDED
Status: CANCELLED | OUTPATIENT
Start: 2022-10-25

## 2022-10-25 RX ORDER — MISOPROSTOL 200 UG/1
800 TABLET ORAL AS NEEDED
Status: CANCELLED | OUTPATIENT
Start: 2022-10-25

## 2022-10-25 RX ORDER — SODIUM CHLORIDE 0.9 % (FLUSH) 0.9 %
10 SYRINGE (ML) INJECTION AS NEEDED
Status: CANCELLED | OUTPATIENT
Start: 2022-10-25

## 2022-10-25 RX ORDER — FAMOTIDINE 10 MG/ML
20 INJECTION, SOLUTION INTRAVENOUS ONCE AS NEEDED
Status: CANCELLED | OUTPATIENT
Start: 2022-10-25

## 2022-10-25 RX ORDER — OXYTOCIN 10 [USP'U]/ML
10 INJECTION, SOLUTION INTRAMUSCULAR; INTRAVENOUS ONCE
Status: CANCELLED | OUTPATIENT
Start: 2022-10-25 | End: 2022-10-25

## 2022-10-25 RX ORDER — SODIUM CHLORIDE, SODIUM LACTATE, POTASSIUM CHLORIDE, CALCIUM CHLORIDE 600; 310; 30; 20 MG/100ML; MG/100ML; MG/100ML; MG/100ML
125 INJECTION, SOLUTION INTRAVENOUS CONTINUOUS
Status: CANCELLED | OUTPATIENT
Start: 2022-10-25

## 2022-10-25 RX ORDER — ONDANSETRON 2 MG/ML
4 INJECTION INTRAMUSCULAR; INTRAVENOUS EVERY 6 HOURS PRN
Status: CANCELLED | OUTPATIENT
Start: 2022-10-25

## 2022-10-25 RX ORDER — METOCLOPRAMIDE HYDROCHLORIDE 5 MG/ML
10 INJECTION INTRAMUSCULAR; INTRAVENOUS ONCE AS NEEDED
Status: CANCELLED | OUTPATIENT
Start: 2022-10-25

## 2022-10-25 RX ORDER — IBUPROFEN 800 MG/1
800 TABLET ORAL ONCE AS NEEDED
Status: CANCELLED | OUTPATIENT
Start: 2022-10-25

## 2022-10-25 NOTE — TELEPHONE ENCOUNTER
Patient called this am.  She states being induced 10-26-22 and she is having a herpes outbreak today.  Patient is requesting to see you today.

## 2022-10-25 NOTE — H&P
OB HISTORY AND PHYSICAL      SUBJECTIVE:    22 y.o. female  currently at 37w5d [facility] complicated by:      Patient Active Problem List    Diagnosis    • Abnormal serum thyroxine (T4) level [R79.89]    • Restless leg syndrome [G25.81]    • HSV-1 infection/ serum positive [B00.9]    • VSD (ventricular septal defect) Fetus [Q21.0]    • Vitamin D deficiency [E55.9]    • IUGR [O36.5990]    • High-risk pregnancy in third trimester [O09.93]    • Patent foramen ovale [Q21.12]        CC/HPI:  Presents with Scheduled IOL.  Patient has been followed by maternal-fetal medicine, Dr. Mccracken.  Recommendations for delivery at 37 1/2 to 38 weeks.    ROS: No leaking fluid, No vaginal bleeding, No contractions and Adequate FM    Past OB History:   OB History    Para Term  AB Living   2 1 1     1   SAB IAB Ectopic Molar Multiple Live Births             1      # Outcome Date GA Lbr Alo/2nd Weight Sex Delivery Anes PTL Lv   2 Current            1 Term 2020 40w0d  2920 g (6 lb 7 oz) F Vaginal unsp   JEAN-PAUL        Prenatal Labs:    External Prenatal Results     Pregnancy Outside Results - Transcribed From Office Records - See Scanned Records For Details     Test Value Date Time    ABO  O  22 1417    Rh  Positive  22 1417    Antibody Screen  Negative  22 1417    Varicella IgG       Rubella  2.59 index 22 1417    Hgb  10.8 g/dL 10/10/22 1156       12.3 g/dL 22 1505       14.4 g/dL 22 1417    Hct  34.8 % 10/10/22 1156       38.0 % 22 1505       43.4 % 22 1417    Glucose Fasting GTT       Glucose Tolerance Test 1 hour       Glucose Tolerance Test 3 hour       Gonorrhea (discrete)  Negative  22 1414    Chlamydia (discrete)  Negative  22 1414    RPR  Non-Reactive  22 1417    VDRL       Syphilis Antibody       HBsAg  Non-Reactive  22 1417    Herpes Simplex Virus PCR       Herpes Simplex VIrus Culture  Negative  22 1054    HIV  Non-Reactive  22  1417    Hep C RNA Quant PCR       Hep C Antibody  Non-Reactive  04/13/22 1417    AFP       Group B Strep  No Group B Streptococcus Isolated at 2 days  10/17/22 0943    GBS Susceptibility to Clindamycin       GBS Susceptibility to Erythromycin       Fetal Fibronectin       Genetic Testing, Maternal Blood                PMHx:    Past Medical History:   Diagnosis Date   • Anxiety    • ASD (atrial septal defect)    • Depression        Medications:  Prenatal MV-Min-Fe Fum-FA-DHA, Vitamin D, ferrous sulfate, and valACYclovir    Allergies:  Allergies   Allergen Reactions   • Azithromycin Rash   • Poison Ivy Extract Swelling   • Poison [place] Extract Swelling       PSHx:    Past Surgical History:   Procedure Laterality Date   • TONSILLECTOMY         Social History:    reports that she has never smoked. She has never used smokeless tobacco. She reports that she does not drink alcohol and does not use drugs.    Family History: Non contributory    Immunizations: See prenatal record for Tdap, Flu, Covid and/or other vaccinations    PHYSICAL EXAM:     General- NAD, alert and oriented, appropriate  Psych- normal mood, good memory  Cardiovascular- Regular rhythm, no murnurs  Respiratory- CTA to bases, no wheezes  Abdomen- Gravid, non tender  Fundus-  Non tender.  Size: smaller than dates  EFW- 5 1/2 lbs, US: 10/13/22 MFM 5#5oz, 17%ile, AC 5%   Pelvis-  Adequate   Cervix- 1cm / 20% / -3  Presentation- VTX  Extremities/DTRs: +1 edema, bilaterally equal, no signs of DVT    Fetal HR: Doptones 110-160, see last OV note  Contractions: Not complaining of any contractions at last OV      ASSESSMENT:  37w5d  Scheduled IOL        PROBLEM LIST/PLAN:  -IUGR  -Fetus w promienent echogenic bowel and fetal arrhythmia, suspected PACs and known fetal VSD to have eval post delivery by peds  -Pt w PFO (pt states was ASD) s/p Cardiology visit completed Dr. Vincent.  No anticipated cardiac issues.  No further recommendations or follow-up  recommended.  -HSV I serum positive,on valtrex 1000mg, no prodrome or lesions    Lab Results   Component Value Date    STREPGPB No Group B Streptococcus Isolated at 2 days 10/17/2022         PLAN:  Admit  Delivery: cytotec and cervical catheter, then pitocin    Plan of care [facility] hospital course, R/B/A/potential SE, suspected length 12-24+hrs have been reviewed with patient and any family or friends present, questions answered to her/his/their satisfaction.  Pt desires to proceed as above.    Counseling:The patient was counseled on the risks, benefits and alternatives of Induction.  Risks reviewed, but are not limited to: bleeding, transfusion, fetal intolerance,  (possibly emergent),  and uterine rupture.  She declines expectant management or  and desires induction.  All her questions have been answered to her satisfaction and she desires to proceed.  Specifically with Cytotec, she understands that it is endorsed by the American College of OB[employer]GYN, but not FDA approved for the induction of labor.          Electronically signed by Cleo Riley DO, 10/25/22, 10:35 AM EDT.    KEELY AGUILAR [facility] ORDERS ONLY  [address] [place] [address]  Dept: [phone]  Loc: [phone]

## 2022-10-25 NOTE — PROGRESS NOTES
Patient thought she had an HSV outbreak.  She has never had genital herpes.  She had a breast lesion that was consistent with herpes and is serum positive.  Picture on patient's phone is not consistent with HSV.  Pinpoint small papule on right vulva consistent with mild folliculitis.  Vulvar exam today is within normal limits.  Small pinpoint folliculitis noted.  Reassurance provided, this is no evidence of HSV in the genital area and IOL/ is safe to proceed tomorrow.   Controlled with current regime

## 2022-10-26 ENCOUNTER — HOSPITAL ENCOUNTER (OUTPATIENT)
Dept: LABOR AND DELIVERY | Facility: HOSPITAL | Age: 22
Discharge: HOME | End: 2022-10-26

## 2022-10-26 ENCOUNTER — ANESTHESIA EVENT (OUTPATIENT)
Dept: LABOR AND DELIVERY | Facility: HOSPITAL | Age: 22
End: 2022-10-26

## 2022-10-26 ENCOUNTER — HOSPITAL ENCOUNTER (INPATIENT)
Facility: HOSPITAL | Age: 22
LOS: 2 days | Discharge: HOME | End: 2022-10-28
Attending: OBSTETRICS & GYNECOLOGY | Admitting: OBSTETRICS & GYNECOLOGY

## 2022-10-26 ENCOUNTER — ANESTHESIA (OUTPATIENT)
Dept: LABOR AND DELIVERY | Facility: HOSPITAL | Age: 22
End: 2022-10-26

## 2022-10-26 DIAGNOSIS — O36.5990 POOR FETAL GROWTH AFFECTING MANAGEMENT OF MOTHER, ANTEPARTUM, SINGLE OR UNSPECIFIED FETUS: ICD-10-CM

## 2022-10-26 PROBLEM — Z34.90 ENCOUNTER FOR INDUCTION OF LABOR: Status: ACTIVE | Noted: 2022-10-26

## 2022-10-26 LAB
ABO GROUP BLD: NORMAL
ALBUMIN SERPL-MCNC: 3.6 G/DL (ref 3.5–5.2)
ALBUMIN/GLOB SERPL: 1.5 G/DL
ALP SERPL-CCNC: 154 U/L (ref 39–117)
ALT SERPL W P-5'-P-CCNC: 14 U/L (ref 1–33)
ANION GAP SERPL CALCULATED.3IONS-SCNC: 11.7 MMOL/L (ref 5–15)
AST SERPL-CCNC: 29 U/L (ref 1–32)
BILIRUB SERPL-MCNC: 0.3 MG/DL (ref 0–1.2)
BLD GP AB SCN SERPL QL: NEGATIVE
BUN SERPL-MCNC: 5 MG/DL (ref 6–20)
BUN/CREAT SERPL: 10.2 (ref 7–25)
CALCIUM SPEC-SCNC: 9 MG/DL (ref 8.6–10.5)
CHLORIDE SERPL-SCNC: 106 MMOL/L (ref 98–107)
CO2 SERPL-SCNC: 20.3 MMOL/L (ref 22–29)
CREAT SERPL-MCNC: 0.49 MG/DL (ref 0.57–1)
CREAT UR-MCNC: 33.3 MG/DL
DEPRECATED RDW RBC AUTO: 49.2 FL (ref 37–54)
EGFRCR SERPLBLD CKD-EPI 2021: 136 ML/MIN/1.73
ERYTHROCYTE [DISTWIDTH] IN BLOOD BY AUTOMATED COUNT: 17.7 % (ref 12.3–15.4)
GLOBULIN UR ELPH-MCNC: 2.4 GM/DL
GLUCOSE SERPL-MCNC: 77 MG/DL (ref 65–99)
HCT VFR BLD AUTO: 33.5 % (ref 34–46.6)
HGB BLD-MCNC: 10.8 G/DL (ref 12–15.9)
MCH RBC QN AUTO: 25.5 PG (ref 26.6–33)
MCHC RBC AUTO-ENTMCNC: 32.2 G/DL (ref 31.5–35.7)
MCV RBC AUTO: 79.2 FL (ref 79–97)
PLATELET # BLD AUTO: 154 10*3/MM3 (ref 140–450)
PMV BLD AUTO: 11.9 FL (ref 6–12)
POTASSIUM SERPL-SCNC: 3.9 MMOL/L (ref 3.5–5.2)
PROT ?TM UR-MCNC: <4 MG/DL
PROT SERPL-MCNC: 6 G/DL (ref 6–8.5)
PROT/CREAT UR: NORMAL MG/G{CREAT}
RBC # BLD AUTO: 4.23 10*6/MM3 (ref 3.77–5.28)
RH BLD: POSITIVE
SODIUM SERPL-SCNC: 138 MMOL/L (ref 136–145)
T&S EXPIRATION DATE: NORMAL
WBC NRBC COR # BLD: 8.37 10*3/MM3 (ref 3.4–10.8)

## 2022-10-26 PROCEDURE — 86900 BLOOD TYPING SEROLOGIC ABO: CPT | Performed by: OBSTETRICS & GYNECOLOGY

## 2022-10-26 PROCEDURE — 82803 BLOOD GASES ANY COMBINATION: CPT | Performed by: OBSTETRICS & GYNECOLOGY

## 2022-10-26 PROCEDURE — 86901 BLOOD TYPING SEROLOGIC RH(D): CPT | Performed by: OBSTETRICS & GYNECOLOGY

## 2022-10-26 PROCEDURE — 86850 RBC ANTIBODY SCREEN: CPT | Performed by: OBSTETRICS & GYNECOLOGY

## 2022-10-26 PROCEDURE — 82570 ASSAY OF URINE CREATININE: CPT | Performed by: OBSTETRICS & GYNECOLOGY

## 2022-10-26 PROCEDURE — 25010000002 MORPHINE SULFATE (PF) 5 MG/ML SOLUTION: Performed by: OBSTETRICS & GYNECOLOGY

## 2022-10-26 PROCEDURE — 25010000002 OXYTOCIN PER 10 UNITS: Performed by: OBSTETRICS & GYNECOLOGY

## 2022-10-26 PROCEDURE — 85027 COMPLETE CBC AUTOMATED: CPT | Performed by: OBSTETRICS & GYNECOLOGY

## 2022-10-26 PROCEDURE — 25010000002 FENTANYL CITRATE (PF) 50 MCG/ML SOLUTION: Performed by: ANESTHESIOLOGY

## 2022-10-26 PROCEDURE — C1755 CATHETER, INTRASPINAL: HCPCS | Performed by: ANESTHESIOLOGY

## 2022-10-26 PROCEDURE — 84156 ASSAY OF PROTEIN URINE: CPT | Performed by: OBSTETRICS & GYNECOLOGY

## 2022-10-26 PROCEDURE — 80053 COMPREHEN METABOLIC PANEL: CPT | Performed by: OBSTETRICS & GYNECOLOGY

## 2022-10-26 RX ORDER — METHYLERGONOVINE MALEATE 0.2 MG/ML
200 INJECTION INTRAVENOUS ONCE AS NEEDED
Status: DISCONTINUED | OUTPATIENT
Start: 2022-10-26 | End: 2022-10-27 | Stop reason: HOSPADM

## 2022-10-26 RX ORDER — LIDOCAINE HYDROCHLORIDE AND EPINEPHRINE 15; 5 MG/ML; UG/ML
INJECTION, SOLUTION EPIDURAL
Status: COMPLETED | OUTPATIENT
Start: 2022-10-26 | End: 2022-10-26

## 2022-10-26 RX ORDER — ONDANSETRON 2 MG/ML
4 INJECTION INTRAMUSCULAR; INTRAVENOUS EVERY 6 HOURS PRN
Status: DISCONTINUED | OUTPATIENT
Start: 2022-10-26 | End: 2022-10-27 | Stop reason: HOSPADM

## 2022-10-26 RX ORDER — FAMOTIDINE 10 MG/ML
20 INJECTION, SOLUTION INTRAVENOUS 2 TIMES DAILY PRN
Status: DISCONTINUED | OUTPATIENT
Start: 2022-10-26 | End: 2022-10-27 | Stop reason: HOSPADM

## 2022-10-26 RX ORDER — SODIUM CHLORIDE 0.9 % (FLUSH) 0.9 %
10 SYRINGE (ML) INJECTION AS NEEDED
Status: DISCONTINUED | OUTPATIENT
Start: 2022-10-26 | End: 2022-10-27 | Stop reason: HOSPADM

## 2022-10-26 RX ORDER — MISOPROSTOL 100 MCG
25 TABLET ORAL ONCE
Status: COMPLETED | OUTPATIENT
Start: 2022-10-26 | End: 2022-10-26

## 2022-10-26 RX ORDER — FAMOTIDINE 20 MG/1
20 TABLET, FILM COATED ORAL 2 TIMES DAILY PRN
Status: DISCONTINUED | OUTPATIENT
Start: 2022-10-26 | End: 2022-10-27 | Stop reason: HOSPADM

## 2022-10-26 RX ORDER — EPHEDRINE SULFATE 50 MG/ML
5 INJECTION, SOLUTION INTRAVENOUS
Status: DISCONTINUED | OUTPATIENT
Start: 2022-10-26 | End: 2022-10-27 | Stop reason: HOSPADM

## 2022-10-26 RX ORDER — ACETAMINOPHEN 325 MG/1
650 TABLET ORAL EVERY 4 HOURS PRN
Status: DISCONTINUED | OUTPATIENT
Start: 2022-10-26 | End: 2022-10-27 | Stop reason: HOSPADM

## 2022-10-26 RX ORDER — MAGNESIUM CARB/ALUMINUM HYDROX 105-160MG
30 TABLET,CHEWABLE ORAL ONCE
Status: DISCONTINUED | OUTPATIENT
Start: 2022-10-26 | End: 2022-10-27 | Stop reason: HOSPADM

## 2022-10-26 RX ORDER — TERBUTALINE SULFATE 1 MG/ML
0.25 INJECTION, SOLUTION SUBCUTANEOUS AS NEEDED
Status: DISCONTINUED | OUTPATIENT
Start: 2022-10-26 | End: 2022-10-27 | Stop reason: HOSPADM

## 2022-10-26 RX ORDER — MORPHINE SULFATE 5 MG/ML
5 INJECTION, SOLUTION INTRAMUSCULAR; INTRAVENOUS
Status: DISCONTINUED | OUTPATIENT
Start: 2022-10-26 | End: 2022-10-27 | Stop reason: HOSPADM

## 2022-10-26 RX ORDER — TRISODIUM CITRATE DIHYDRATE AND CITRIC ACID MONOHYDRATE 500; 334 MG/5ML; MG/5ML
30 SOLUTION ORAL ONCE AS NEEDED
Status: DISCONTINUED | OUTPATIENT
Start: 2022-10-26 | End: 2022-10-27 | Stop reason: HOSPADM

## 2022-10-26 RX ORDER — LIDOCAINE HYDROCHLORIDE 20 MG/ML
20 INJECTION, SOLUTION INFILTRATION; PERINEURAL AS NEEDED
Status: DISCONTINUED | OUTPATIENT
Start: 2022-10-26 | End: 2022-10-27 | Stop reason: HOSPADM

## 2022-10-26 RX ORDER — FENTANYL CITRATE 50 UG/ML
INJECTION, SOLUTION INTRAMUSCULAR; INTRAVENOUS
Status: COMPLETED
Start: 2022-10-26 | End: 2022-10-26

## 2022-10-26 RX ORDER — FENTANYL CITRATE 50 UG/ML
INJECTION, SOLUTION INTRAMUSCULAR; INTRAVENOUS
Status: COMPLETED | OUTPATIENT
Start: 2022-10-26 | End: 2022-10-26

## 2022-10-26 RX ORDER — OXYTOCIN/0.9 % SODIUM CHLORIDE 30/500 ML
0-4 PLASTIC BAG, INJECTION (ML) INTRAVENOUS CONTINUOUS PRN
Status: DISCONTINUED | OUTPATIENT
Start: 2022-10-26 | End: 2022-10-27 | Stop reason: HOSPADM

## 2022-10-26 RX ORDER — PROMETHAZINE HYDROCHLORIDE 12.5 MG/1
12.5 TABLET ORAL EVERY 6 HOURS PRN
Status: DISCONTINUED | OUTPATIENT
Start: 2022-10-26 | End: 2022-10-27 | Stop reason: HOSPADM

## 2022-10-26 RX ORDER — MINERAL OIL
1 OIL (ML) MISCELLANEOUS AS NEEDED
Status: DISCONTINUED | OUTPATIENT
Start: 2022-10-26 | End: 2022-10-27 | Stop reason: HOSPADM

## 2022-10-26 RX ORDER — MISOPROSTOL 200 UG/1
800 TABLET ORAL AS NEEDED
Status: DISCONTINUED | OUTPATIENT
Start: 2022-10-26 | End: 2022-10-27 | Stop reason: HOSPADM

## 2022-10-26 RX ORDER — METOCLOPRAMIDE HYDROCHLORIDE 5 MG/ML
10 INJECTION INTRAMUSCULAR; INTRAVENOUS ONCE AS NEEDED
Status: DISCONTINUED | OUTPATIENT
Start: 2022-10-26 | End: 2022-10-27 | Stop reason: HOSPADM

## 2022-10-26 RX ORDER — ONDANSETRON 4 MG/1
4 TABLET, FILM COATED ORAL EVERY 6 HOURS PRN
Status: DISCONTINUED | OUTPATIENT
Start: 2022-10-26 | End: 2022-10-27 | Stop reason: HOSPADM

## 2022-10-26 RX ORDER — SODIUM CHLORIDE 0.9 % (FLUSH) 0.9 %
3 SYRINGE (ML) INJECTION EVERY 12 HOURS SCHEDULED
Status: DISCONTINUED | OUTPATIENT
Start: 2022-10-26 | End: 2022-10-27 | Stop reason: HOSPADM

## 2022-10-26 RX ORDER — SODIUM CHLORIDE, SODIUM LACTATE, POTASSIUM CHLORIDE, CALCIUM CHLORIDE 600; 310; 30; 20 MG/100ML; MG/100ML; MG/100ML; MG/100ML
125 INJECTION, SOLUTION INTRAVENOUS CONTINUOUS
Status: DISCONTINUED | OUTPATIENT
Start: 2022-10-26 | End: 2022-10-27

## 2022-10-26 RX ORDER — LIDOCAINE HYDROCHLORIDE 10 MG/ML
5 INJECTION, SOLUTION EPIDURAL; INFILTRATION; INTRACAUDAL; PERINEURAL AS NEEDED
Status: DISCONTINUED | OUTPATIENT
Start: 2022-10-26 | End: 2022-10-27 | Stop reason: HOSPADM

## 2022-10-26 RX ADMIN — MORPHINE SULFATE 5 MG: 5 INJECTION, SOLUTION INTRAMUSCULAR; INTRAVENOUS at 11:33

## 2022-10-26 RX ADMIN — LIDOCAINE HYDROCHLORIDE AND EPINEPHRINE 3 ML: 15; 5 INJECTION, SOLUTION EPIDURAL at 20:03

## 2022-10-26 RX ADMIN — Medication 10 ML/HR: at 20:15

## 2022-10-26 RX ADMIN — FENTANYL CITRATE 100 MCG: 50 INJECTION, SOLUTION INTRAMUSCULAR; INTRAVENOUS at 20:03

## 2022-10-26 RX ADMIN — MISOPROSTOL 400 MCG: 200 TABLET ORAL at 23:58

## 2022-10-26 RX ADMIN — MISOPROSTOL 25 MCG: 100 TABLET ORAL at 07:59

## 2022-10-26 RX ADMIN — SODIUM CHLORIDE, POTASSIUM CHLORIDE, SODIUM LACTATE AND CALCIUM CHLORIDE 125 ML/HR: 600; 310; 30; 20 INJECTION, SOLUTION INTRAVENOUS at 13:21

## 2022-10-26 RX ADMIN — LIDOCAINE HYDROCHLORIDE AND EPINEPHRINE 2 ML: 15; 5 INJECTION, SOLUTION EPIDURAL at 20:07

## 2022-10-26 RX ADMIN — OXYTOCIN 1 MILLI-UNITS/MIN: 10 INJECTION, SOLUTION INTRAMUSCULAR; INTRAVENOUS at 12:22

## 2022-10-26 RX ADMIN — SODIUM CHLORIDE, POTASSIUM CHLORIDE, SODIUM LACTATE AND CALCIUM CHLORIDE 125 ML/HR: 600; 310; 30; 20 INJECTION, SOLUTION INTRAVENOUS at 07:47

## 2022-10-26 NOTE — ANESTHESIA PREPROCEDURE EVALUATION
Anesthesia Evaluation     Patient summary reviewed and Nursing notes reviewed   no history of anesthetic complications:  NPO Solid Status: > 8 hours  NPO Liquid Status: > 2 hours           Airway   Mallampati: II  TM distance: >3 FB  Neck ROM: full  No difficulty expected  Dental      Pulmonary - negative pulmonary ROS and normal exam    breath sounds clear to auscultation  Cardiovascular - negative cardio ROS and normal exam  Exercise tolerance: good (4-7 METS)    Rhythm: regular  Rate: normal        Neuro/Psych- negative ROS  GI/Hepatic/Renal/Endo - negative ROS     Musculoskeletal (-) negative ROS    Abdominal    Substance History - negative use     OB/GYN negative ob/gyn ROS         Other - negative ROS       ROS/Med Hx Other:                    Anesthesia Plan    ASA 2     epidural       Anesthetic plan, risks, benefits, and alternatives have been provided, discussed and informed consent has been obtained with: patient.        CODE STATUS:    Code Status (Patient has no pulse and is not breathing): CPR (Attempt to Resuscitate)  Medical Interventions (Patient has pulse or is breathing): Full

## 2022-10-27 PROBLEM — B00.9 HSV-1 INFECTION: Status: RESOLVED | Noted: 2022-09-23 | Resolved: 2022-10-27

## 2022-10-27 PROBLEM — Q21.12 PATENT FORAMEN OVALE: Status: RESOLVED | Noted: 2020-02-03 | Resolved: 2022-10-27

## 2022-10-27 PROBLEM — O09.93 HIGH-RISK PREGNANCY IN THIRD TRIMESTER: Status: RESOLVED | Noted: 2022-04-13 | Resolved: 2022-10-27

## 2022-10-27 PROBLEM — G25.81 RESTLESS LEG SYNDROME: Status: RESOLVED | Noted: 2022-10-10 | Resolved: 2022-10-27

## 2022-10-27 PROBLEM — O13.3 GESTATIONAL HYPERTENSION, THIRD TRIMESTER: Status: ACTIVE | Noted: 2022-10-27

## 2022-10-27 PROBLEM — E55.9 VITAMIN D DEFICIENCY: Status: RESOLVED | Noted: 2022-08-21 | Resolved: 2022-10-27

## 2022-10-27 PROBLEM — Z34.90 ENCOUNTER FOR INDUCTION OF LABOR: Status: RESOLVED | Noted: 2022-10-26 | Resolved: 2022-10-27

## 2022-10-27 LAB
BASE EXCESS BLDCOA CALC-SCNC: -6.6 MMOL/L (ref -2–2)
BASE EXCESS BLDCOV CALC-SCNC: -4.3 MMOL/L (ref -2–2)
HCO3 BLDCOA-SCNC: 21.5 MMOL/L
HCO3 BLDCOV-SCNC: 20.9 MMOL/L
PCO2 BLDCOA: 53.3 MMHG (ref 33–49)
PCO2 BLDCOV: 39 MM HG (ref 28–40)
PH BLDCOA: 7.22 PH UNITS (ref 7.21–7.31)
PH BLDCOV: 7.35 PH UNITS (ref 7.31–7.37)
PO2 BLDCOA: <40.5 MMHG
PO2 BLDCOV: <40.5 MM HG (ref 21–31)

## 2022-10-27 PROCEDURE — 59400 OBSTETRICAL CARE: CPT | Performed by: OBSTETRICS & GYNECOLOGY

## 2022-10-27 PROCEDURE — 51702 INSERT TEMP BLADDER CATH: CPT

## 2022-10-27 PROCEDURE — 0503F POSTPARTUM CARE VISIT: CPT | Performed by: OBSTETRICS & GYNECOLOGY

## 2022-10-27 RX ORDER — SODIUM CHLORIDE 0.9 % (FLUSH) 0.9 %
1-10 SYRINGE (ML) INJECTION AS NEEDED
Status: DISCONTINUED | OUTPATIENT
Start: 2022-10-27 | End: 2022-10-28 | Stop reason: HOSPADM

## 2022-10-27 RX ORDER — OXYTOCIN 10 [USP'U]/ML
10 INJECTION, SOLUTION INTRAMUSCULAR; INTRAVENOUS ONCE
Status: DISCONTINUED | OUTPATIENT
Start: 2022-10-27 | End: 2022-10-27 | Stop reason: HOSPADM

## 2022-10-27 RX ORDER — CALCIUM CARBONATE 200(500)MG
2 TABLET,CHEWABLE ORAL 3 TIMES DAILY PRN
Status: DISCONTINUED | OUTPATIENT
Start: 2022-10-27 | End: 2022-10-27 | Stop reason: HOSPADM

## 2022-10-27 RX ORDER — OXYCODONE HYDROCHLORIDE 5 MG/1
10 TABLET ORAL EVERY 4 HOURS PRN
Status: DISCONTINUED | OUTPATIENT
Start: 2022-10-27 | End: 2022-10-28 | Stop reason: HOSPADM

## 2022-10-27 RX ORDER — FAMOTIDINE 20 MG/1
20 TABLET, FILM COATED ORAL ONCE AS NEEDED
Status: DISCONTINUED | OUTPATIENT
Start: 2022-10-27 | End: 2022-10-27 | Stop reason: HOSPADM

## 2022-10-27 RX ORDER — ONDANSETRON 2 MG/ML
4 INJECTION INTRAMUSCULAR; INTRAVENOUS EVERY 6 HOURS PRN
Status: DISCONTINUED | OUTPATIENT
Start: 2022-10-27 | End: 2022-10-27 | Stop reason: HOSPADM

## 2022-10-27 RX ORDER — CALCIUM CARBONATE 200(500)MG
2 TABLET,CHEWABLE ORAL 3 TIMES DAILY PRN
Status: DISCONTINUED | OUTPATIENT
Start: 2022-10-27 | End: 2022-10-28 | Stop reason: HOSPADM

## 2022-10-27 RX ORDER — ACETAMINOPHEN 325 MG/1
650 TABLET ORAL
Status: DISCONTINUED | OUTPATIENT
Start: 2022-10-27 | End: 2022-10-28 | Stop reason: HOSPADM

## 2022-10-27 RX ORDER — BISACODYL 10 MG
10 SUPPOSITORY, RECTAL RECTAL DAILY PRN
Status: DISCONTINUED | OUTPATIENT
Start: 2022-10-28 | End: 2022-10-28 | Stop reason: HOSPADM

## 2022-10-27 RX ORDER — ACETAMINOPHEN 325 MG/1
650 TABLET ORAL ONCE AS NEEDED
Status: DISCONTINUED | OUTPATIENT
Start: 2022-10-27 | End: 2022-10-27 | Stop reason: HOSPADM

## 2022-10-27 RX ORDER — IBUPROFEN 800 MG/1
800 TABLET ORAL ONCE AS NEEDED
Status: DISCONTINUED | OUTPATIENT
Start: 2022-10-27 | End: 2022-10-27 | Stop reason: HOSPADM

## 2022-10-27 RX ORDER — HYDROCODONE BITARTRATE AND ACETAMINOPHEN 5; 325 MG/1; MG/1
1 TABLET ORAL EVERY 4 HOURS PRN
Status: DISCONTINUED | OUTPATIENT
Start: 2022-10-27 | End: 2022-10-27 | Stop reason: HOSPADM

## 2022-10-27 RX ORDER — DOCUSATE SODIUM 100 MG/1
100 CAPSULE, LIQUID FILLED ORAL DAILY
Status: DISCONTINUED | OUTPATIENT
Start: 2022-10-27 | End: 2022-10-28 | Stop reason: HOSPADM

## 2022-10-27 RX ORDER — PROMETHAZINE HYDROCHLORIDE 25 MG/1
25 TABLET ORAL EVERY 6 HOURS PRN
Status: DISCONTINUED | OUTPATIENT
Start: 2022-10-27 | End: 2022-10-27 | Stop reason: HOSPADM

## 2022-10-27 RX ORDER — ONDANSETRON 4 MG/1
4 TABLET, FILM COATED ORAL EVERY 6 HOURS PRN
Status: DISCONTINUED | OUTPATIENT
Start: 2022-10-27 | End: 2022-10-27 | Stop reason: HOSPADM

## 2022-10-27 RX ORDER — ONDANSETRON 4 MG/1
4 TABLET, FILM COATED ORAL EVERY 8 HOURS PRN
Status: DISCONTINUED | OUTPATIENT
Start: 2022-10-27 | End: 2022-10-28 | Stop reason: HOSPADM

## 2022-10-27 RX ORDER — PROMETHAZINE HYDROCHLORIDE 12.5 MG/1
12.5 TABLET ORAL EVERY 4 HOURS PRN
Status: DISCONTINUED | OUTPATIENT
Start: 2022-10-27 | End: 2022-10-28 | Stop reason: HOSPADM

## 2022-10-27 RX ORDER — IBUPROFEN 600 MG/1
600 TABLET ORAL EVERY 6 HOURS SCHEDULED
Status: DISCONTINUED | OUTPATIENT
Start: 2022-10-27 | End: 2022-10-28 | Stop reason: HOSPADM

## 2022-10-27 RX ORDER — OXYCODONE HYDROCHLORIDE 5 MG/1
5 TABLET ORAL EVERY 4 HOURS PRN
Status: DISCONTINUED | OUTPATIENT
Start: 2022-10-27 | End: 2022-10-28 | Stop reason: HOSPADM

## 2022-10-27 RX ORDER — HYDROCODONE BITARTRATE AND ACETAMINOPHEN 10; 325 MG/1; MG/1
1 TABLET ORAL EVERY 4 HOURS PRN
Status: DISCONTINUED | OUTPATIENT
Start: 2022-10-27 | End: 2022-10-27 | Stop reason: HOSPADM

## 2022-10-27 RX ORDER — FAMOTIDINE 10 MG/ML
20 INJECTION, SOLUTION INTRAVENOUS ONCE AS NEEDED
Status: DISCONTINUED | OUTPATIENT
Start: 2022-10-27 | End: 2022-10-27 | Stop reason: HOSPADM

## 2022-10-27 RX ADMIN — IBUPROFEN 600 MG: 600 TABLET, FILM COATED ORAL at 12:07

## 2022-10-27 RX ADMIN — IBUPROFEN 600 MG: 600 TABLET, FILM COATED ORAL at 05:28

## 2022-10-27 RX ADMIN — DOCUSATE SODIUM 100 MG: 100 CAPSULE, LIQUID FILLED ORAL at 08:27

## 2022-10-27 RX ADMIN — ACETAMINOPHEN 650 MG: 325 TABLET ORAL at 21:13

## 2022-10-27 RX ADMIN — IBUPROFEN 600 MG: 600 TABLET, FILM COATED ORAL at 17:31

## 2022-10-27 RX ADMIN — ACETAMINOPHEN 650 MG: 325 TABLET ORAL at 08:27

## 2022-10-27 RX ADMIN — ACETAMINOPHEN 650 MG: 325 TABLET ORAL at 14:37

## 2022-10-27 NOTE — ANESTHESIA PROCEDURE NOTES
Labor Epidural    Pre-sedation assessment completed: 10/26/2022 7:55 PM    Patient reassessed immediately prior to procedure    Patient location during procedure: OB  Start Time: 10/26/2022 7:55 PM  Stop Time: 10/26/2022 8:03 PM  Performed By  Anesthesiologist: Yves Milligan MD  Preanesthetic Checklist  Completed: patient identified, IV checked, risks and benefits discussed, surgical consent, monitors and equipment checked, pre-op evaluation and timeout performed  Prep:  Pt Position:sitting  Sterile Tech:cap, gloves, sterile barrier, mask and gown  Prep:chlorhexidine gluconate and isopropyl alcohol  Monitoring:blood pressure monitoring, continuous pulse oximetry and EKG  Epidural Block Procedure:  Approach:midline  Guidance:landmark technique and palpation technique  Location:L4-L5  Needle Type:Tuohy  Needle Gauge:17 G  Loss of Resistance Medium: saline  Cath Depth at skin:12 cm  Paresthesia: none  Aspiration:negative  Test Dose:negative  Medication: lidocaine 1.5%-EPINEPHrine 1:200,000 (XYLOCAINE W/EPI) injection - Epidural   3 mL - 10/26/2022 8:03:00 PM  fentaNYL citrate (PF) (SUBLIMAZE) injection - Epidural, Back   100 mcg - 10/26/2022 8:03:00 PM  Number of Attempts: 1  Post Assessment:  Dressing:occlusive dressing applied and secured with tape  Pt Tolerance:patient tolerated the procedure well with no apparent complications  Complications:no

## 2022-10-27 NOTE — ANESTHESIA POSTPROCEDURE EVALUATION
Patient: Rosa Reid    Procedure Summary     Date: 10/26/22 Room / Location:     Anesthesia Start: 1955 Anesthesia Stop: 2349    Procedure: LABOR ANALGESIA Diagnosis:     Scheduled Providers:  Provider: Yves Milligan MD    Anesthesia Type: epidural ASA Status: 2          Anesthesia Type: epidural    Vitals  Vitals Value Taken Time   /81 10/27/22 0905   Temp 36.5 °C (97.7 °F) 10/27/22 0905   Pulse 97 10/27/22 0905   Resp 18 10/27/22 0905   SpO2 100 % 10/26/22 2354   Vitals shown include unvalidated device data.        Post Anesthesia Care and Evaluation    Patient location during evaluation: bedside  Patient participation: complete - patient participated  Level of consciousness: awake  Pain management: adequate    Airway patency: patent  Anesthetic complications: No anesthetic complications  PONV Status: none  Cardiovascular status: acceptable and stable  Respiratory status: acceptable  Hydration status: acceptable    Comments: An Anesthesiologist personally participated in the most demanding procedures (including induction and emergence if applicable) in the anesthesia plan, monitored the course of anesthesia administration at frequent intervals and remained physically present and available for immediate diagnosis and treatment of emergencies.

## 2022-10-28 VITALS
WEIGHT: 196 LBS | RESPIRATION RATE: 16 BRPM | DIASTOLIC BLOOD PRESSURE: 59 MMHG | SYSTOLIC BLOOD PRESSURE: 116 MMHG | TEMPERATURE: 98 F | HEIGHT: 64 IN | OXYGEN SATURATION: 100 % | BODY MASS INDEX: 33.46 KG/M2 | HEART RATE: 102 BPM

## 2022-10-28 RX ORDER — IBUPROFEN 600 MG/1
600 TABLET ORAL EVERY 6 HOURS SCHEDULED
Qty: 120 TABLET | Refills: 2 | Status: SHIPPED | OUTPATIENT
Start: 2022-10-28 | End: 2022-10-31 | Stop reason: HOSPADM

## 2022-10-28 RX ORDER — IBUPROFEN 800 MG/1
800 TABLET ORAL EVERY 8 HOURS PRN
Qty: 30 TABLET | Refills: 1 | Status: SHIPPED | OUTPATIENT
Start: 2022-10-28 | End: 2022-11-07

## 2022-10-28 RX ORDER — ACETAMINOPHEN 325 MG/1
650 TABLET ORAL EVERY 6 HOURS PRN
Qty: 30 TABLET | Refills: 1 | Status: SHIPPED | OUTPATIENT
Start: 2022-10-28 | End: 2022-10-31 | Stop reason: HOSPADM

## 2022-10-28 RX ORDER — ACETAMINOPHEN 325 MG/1
650 TABLET ORAL EVERY 6 HOURS PRN
Qty: 120 TABLET | Refills: 2 | Status: SHIPPED | OUTPATIENT
Start: 2022-10-28

## 2022-10-28 RX ADMIN — ACETAMINOPHEN 650 MG: 325 TABLET ORAL at 08:01

## 2022-10-28 RX ADMIN — ACETAMINOPHEN 650 MG: 325 TABLET ORAL at 03:22

## 2022-10-28 RX ADMIN — DOCUSATE SODIUM 100 MG: 100 CAPSULE, LIQUID FILLED ORAL at 08:01

## 2022-10-28 RX ADMIN — IBUPROFEN 600 MG: 600 TABLET, FILM COATED ORAL at 12:40

## 2022-10-28 RX ADMIN — IBUPROFEN 600 MG: 600 TABLET, FILM COATED ORAL at 00:00

## 2022-10-28 RX ADMIN — IBUPROFEN 600 MG: 600 TABLET, FILM COATED ORAL at 06:16

## 2022-10-31 ENCOUNTER — HOSPITAL ENCOUNTER (OUTPATIENT)
Facility: HOSPITAL | Age: 22
Discharge: HOME | End: 2022-10-31
Attending: OBSTETRICS & GYNECOLOGY | Admitting: OBSTETRICS & GYNECOLOGY

## 2022-10-31 VITALS
HEART RATE: 104 BPM | WEIGHT: 196 LBS | HEIGHT: 64 IN | DIASTOLIC BLOOD PRESSURE: 82 MMHG | SYSTOLIC BLOOD PRESSURE: 130 MMHG | BODY MASS INDEX: 33.46 KG/M2

## 2022-10-31 LAB
ALBUMIN SERPL-MCNC: 3.6 G/DL (ref 3.5–5.2)
ALBUMIN/GLOB SERPL: 1.4 G/DL
ALP SERPL-CCNC: 134 U/L (ref 39–117)
ALT SERPL W P-5'-P-CCNC: 30 U/L (ref 1–33)
ANION GAP SERPL CALCULATED.3IONS-SCNC: 9.6 MMOL/L (ref 5–15)
AST SERPL-CCNC: 29 U/L (ref 1–32)
BASOPHILS # BLD AUTO: 0.04 10*3/MM3 (ref 0–0.2)
BASOPHILS NFR BLD AUTO: 0.6 % (ref 0–1.5)
BILIRUB SERPL-MCNC: 0.3 MG/DL (ref 0–1.2)
BUN SERPL-MCNC: 8 MG/DL (ref 6–20)
BUN/CREAT SERPL: 9.9 (ref 7–25)
CALCIUM SPEC-SCNC: 9.2 MG/DL (ref 8.6–10.5)
CHLORIDE SERPL-SCNC: 108 MMOL/L (ref 98–107)
CO2 SERPL-SCNC: 26.4 MMOL/L (ref 22–29)
CREAT SERPL-MCNC: 0.81 MG/DL (ref 0.57–1)
CREAT UR-MCNC: 21.5 MG/DL
DEPRECATED RDW RBC AUTO: 52.3 FL (ref 37–54)
EGFRCR SERPLBLD CKD-EPI 2021: 104.8 ML/MIN/1.73
EOSINOPHIL # BLD AUTO: 0.23 10*3/MM3 (ref 0–0.4)
EOSINOPHIL NFR BLD AUTO: 3.5 % (ref 0.3–6.2)
ERYTHROCYTE [DISTWIDTH] IN BLOOD BY AUTOMATED COUNT: 17.8 % (ref 12.3–15.4)
GLOBULIN UR ELPH-MCNC: 2.5 GM/DL
GLUCOSE SERPL-MCNC: 83 MG/DL (ref 65–99)
HCT VFR BLD AUTO: 36.2 % (ref 34–46.6)
HGB BLD-MCNC: 11.4 G/DL (ref 12–15.9)
IMM GRANULOCYTES # BLD AUTO: 0.02 10*3/MM3 (ref 0–0.05)
IMM GRANULOCYTES NFR BLD AUTO: 0.3 % (ref 0–0.5)
LDH SERPL-CCNC: 287 U/L (ref 135–214)
LYMPHOCYTES # BLD AUTO: 1.33 10*3/MM3 (ref 0.7–3.1)
LYMPHOCYTES NFR BLD AUTO: 20.2 % (ref 19.6–45.3)
MCH RBC QN AUTO: 25.7 PG (ref 26.6–33)
MCHC RBC AUTO-ENTMCNC: 31.5 G/DL (ref 31.5–35.7)
MCV RBC AUTO: 81.7 FL (ref 79–97)
MONOCYTES # BLD AUTO: 0.37 10*3/MM3 (ref 0.1–0.9)
MONOCYTES NFR BLD AUTO: 5.6 % (ref 5–12)
NEUTROPHILS NFR BLD AUTO: 4.59 10*3/MM3 (ref 1.7–7)
NEUTROPHILS NFR BLD AUTO: 69.8 % (ref 42.7–76)
NRBC BLD AUTO-RTO: 0 /100 WBC (ref 0–0.2)
PLATELET # BLD AUTO: 199 10*3/MM3 (ref 140–450)
PMV BLD AUTO: 10.8 FL (ref 6–12)
POTASSIUM SERPL-SCNC: 3.9 MMOL/L (ref 3.5–5.2)
PROT ?TM UR-MCNC: 6.5 MG/DL
PROT SERPL-MCNC: 6.1 G/DL (ref 6–8.5)
PROT/CREAT UR: 0.3 MG/G{CREAT}
RBC # BLD AUTO: 4.43 10*6/MM3 (ref 3.77–5.28)
SODIUM SERPL-SCNC: 144 MMOL/L (ref 136–145)
URATE SERPL-MCNC: 7.1 MG/DL (ref 2.4–5.7)
WBC NRBC COR # BLD: 6.58 10*3/MM3 (ref 3.4–10.8)

## 2022-10-31 PROCEDURE — G0463 HOSPITAL OUTPT CLINIC VISIT: HCPCS

## 2022-10-31 PROCEDURE — 80053 COMPREHEN METABOLIC PANEL: CPT | Performed by: OBSTETRICS & GYNECOLOGY

## 2022-10-31 PROCEDURE — 85025 COMPLETE CBC W/AUTO DIFF WBC: CPT | Performed by: OBSTETRICS & GYNECOLOGY

## 2022-10-31 PROCEDURE — 83615 LACTATE (LD) (LDH) ENZYME: CPT | Performed by: OBSTETRICS & GYNECOLOGY

## 2022-10-31 PROCEDURE — 82570 ASSAY OF URINE CREATININE: CPT | Performed by: OBSTETRICS & GYNECOLOGY

## 2022-10-31 PROCEDURE — 84550 ASSAY OF BLOOD/URIC ACID: CPT | Performed by: OBSTETRICS & GYNECOLOGY

## 2022-10-31 PROCEDURE — 84156 ASSAY OF PROTEIN URINE: CPT | Performed by: OBSTETRICS & GYNECOLOGY

## 2022-10-31 RX ORDER — LABETALOL 200 MG/1
200 TABLET, FILM COATED ORAL 2 TIMES DAILY
Qty: 60 TABLET | Refills: 0 | Status: SHIPPED | OUTPATIENT
Start: 2022-10-31 | End: 2022-11-30

## 2022-10-31 RX ORDER — NIFEDIPINE 10 MG/1
10 CAPSULE ORAL ONCE
Status: COMPLETED | OUTPATIENT
Start: 2022-10-31 | End: 2022-10-31

## 2022-10-31 RX ADMIN — NIFEDIPINE 10 MG: 10 CAPSULE ORAL at 13:34

## 2022-11-03 ENCOUNTER — POSTPARTUM VISIT (OUTPATIENT)
Dept: OBSTETRICS AND GYNECOLOGY | Facility: CLINIC | Age: 22
End: 2022-11-03

## 2022-11-03 VITALS — WEIGHT: 176.2 LBS | DIASTOLIC BLOOD PRESSURE: 78 MMHG | BODY MASS INDEX: 30.24 KG/M2 | SYSTOLIC BLOOD PRESSURE: 126 MMHG

## 2022-11-03 PROCEDURE — 0503F POSTPARTUM CARE VISIT: CPT | Performed by: OBSTETRICS & GYNECOLOGY

## 2022-11-03 NOTE — PROGRESS NOTES
POSTPARTUM Follow Up Visit    CC:  Postpartum     HPI:      Antepartum or Postpartum complications: Gestational hypertension, IUGR  Delivery type:     Female, 5 pounds 13 ounces  Baby still in NICU.    Perineum: Intact  Feeding: Pumping       Last PAP: 2022 Neg  Last Completed Pap Smear     This patient has no relevant Health Maintenance data.          /78   Wt 79.9 kg (176 lb 3.2 oz)   LMP 2022   Breastfeeding Yes Comment: Pumping  BMI 30.24 kg/m²     Physical Exam  Normotensive today    ASSESSMENT AND PLAN:    Postpartum gestational hypertension, stable    Counseling:  Patient to check blood pressure at home.  Okay to stop blood pressure medicine if consistently under 130/80 without medication.  Recommend stopping medicine 2 days prior to next follow-up    Follow Up:  Return in about 4 weeks (around 2022).          Eloy Murrell Sr., MD  2022

## 2022-11-26 ENCOUNTER — TELEPHONE (OUTPATIENT)
Dept: LACTATION | Facility: HOSPITAL | Age: 22
End: 2022-11-26

## 2022-11-30 PROBLEM — O36.5990 POOR FETAL GROWTH AFFECTING MANAGEMENT OF MOTHER, ANTEPARTUM: Status: RESOLVED | Noted: 2022-08-01 | Resolved: 2022-11-30

## 2022-11-30 PROBLEM — Q21.0 VSD (VENTRICULAR SEPTAL DEFECT): Status: RESOLVED | Noted: 2022-09-22 | Resolved: 2022-11-30

## 2022-12-07 NOTE — PROGRESS NOTES
POSTPARTUM Follow Up Visit      Chief Complaint   Patient presents with   • Postpartum Care     HPI:      • Date of delivery: 10/26/2022  • Delivery type:            Perineum: Intact  • Delivering Provider:   Dr. Cleo Riley      • Feeding: Breast and Bottle  • Pain:  no  • Vaginal Bleeding:  no  • Depressed/Anxious:  yes, anxiety.  Was planning meds prepreg never started, wants to start meds.  Had used zoloft in past, didn't like it.  Lexapro worked in past.  EPDS score: 5   #10: 0  • Plans for BC:  OCP (estrogen/progesterone)  • Weight at last OB OV:  196lbs  • Last pap date and result: 2022 Neg    Discharge Summary by Cleo Riley DO (10/27/2022 00:43)     PHYSICAL EXAM:  /72   Wt 74.8 kg (165 lb)   LMP 2022   Breastfeeding Yes   BMI 28.32 kg/m²  11.3 kg (25 lb)  General- NAD, alert and oriented, appropriate  Psych- Normal mood, good memory, good eye contact      Abdomen- Soft, non distended, non tender, no masses    External genitalia- Normal.    Urethra/Bladder/Vagina- Normal, no masses, non-tender, no prolapse     Cervix- Normal, no lesions, no discharge, no CMT  Uterus- Normal size, shape & consistency.  Non tender, mobile, & no prolapse  Adnexa- Normal, no mass, non-tender    Lymphatic- No palpable groin nodes  Extremities- No edema    ASSESSMENT AND PLAN:  Diagnoses and all orders for this visit:    1. Postpartum follow-up (Primary)    2. Birth control counseling  -     norethindrone (MICRONOR) 0.35 MG tablet; Take 1 tablet by mouth Daily.  Dispense: 90 tablet; Refill: 4    3. Vaccine counseling    4. GHTN delivered  Comments:  Off meds, resolved    5. Anxiety  Comments:  Declines therapy. Pt has used lexapro in past, FU w PCP if desires change in meds or here if just needs increase in dose.   Orders:  -     escitalopram (LEXAPRO) 10 MG tablet; Take 1 tablet by mouth Daily.  Dispense: 30 tablet; Refill: 12    6. Abnormal serum thyroxine (T4) level  Overview:  In preg, recheck  PP    Orders:  -     TSH  -     T4, Free    Counseling:    • May resume intercourse  • May resume normal activities  • Core strengthening exercises reviewed and recommended  • Kegel exercises reviewed and recommended  • Ok to return to work/school  • Use backup contraception for 4 weeks after initiating chosen BC  • Importance of COVID vaccine and booster  • Importance of FLU vaccine        Follow Up:  Return in about 1 year (around 12/9/2023) for WWE or prn need to increase lexapro or change.          Cleo Riley,   12/09/2022    [facility]  [facility] [place] [address]  Dept: [phone]  Dept Fax: [phone]  Loc: [phone]  Loc Fax: [phone]

## 2022-12-09 ENCOUNTER — POSTPARTUM VISIT (OUTPATIENT)
Dept: OBSTETRICS AND GYNECOLOGY | Facility: CLINIC | Age: 22
End: 2022-12-09

## 2022-12-09 VITALS — SYSTOLIC BLOOD PRESSURE: 118 MMHG | DIASTOLIC BLOOD PRESSURE: 72 MMHG | BODY MASS INDEX: 28.32 KG/M2 | WEIGHT: 165 LBS

## 2022-12-09 DIAGNOSIS — F41.9 ANXIETY: ICD-10-CM

## 2022-12-09 DIAGNOSIS — R79.89 ABNORMAL SERUM THYROXINE (T4) LEVEL: ICD-10-CM

## 2022-12-09 DIAGNOSIS — Z71.85 VACCINE COUNSELING: ICD-10-CM

## 2022-12-09 DIAGNOSIS — Z30.09 BIRTH CONTROL COUNSELING: ICD-10-CM

## 2022-12-09 PROCEDURE — 99214 OFFICE O/P EST MOD 30 MIN: CPT | Performed by: OBSTETRICS & GYNECOLOGY

## 2022-12-09 RX ORDER — ACETAMINOPHEN AND CODEINE PHOSPHATE 120; 12 MG/5ML; MG/5ML
1 SOLUTION ORAL DAILY
Qty: 90 TABLET | Refills: 4 | Status: SHIPPED | OUTPATIENT
Start: 2022-12-09 | End: 2023-01-30

## 2022-12-09 RX ORDER — ESCITALOPRAM OXALATE 10 MG/1
10 TABLET ORAL DAILY
Qty: 30 TABLET | Refills: 5 | Status: SHIPPED | OUTPATIENT
Start: 2022-12-09 | End: 2022-12-09

## 2022-12-09 RX ORDER — ESCITALOPRAM OXALATE 10 MG/1
10 TABLET ORAL DAILY
Qty: 30 TABLET | Refills: 12 | Status: SHIPPED | OUTPATIENT
Start: 2022-12-09 | End: 2023-01-11 | Stop reason: SDUPTHER

## 2023-01-11 ENCOUNTER — TELEPHONE (OUTPATIENT)
Dept: OBSTETRICS AND GYNECOLOGY | Facility: CLINIC | Age: 23
End: 2023-01-11
Payer: COMMERCIAL

## 2023-01-11 DIAGNOSIS — F41.9 ANXIETY: ICD-10-CM

## 2023-01-11 RX ORDER — ESCITALOPRAM OXALATE 10 MG/1
10 TABLET ORAL DAILY
Qty: 90 TABLET | Refills: 3 | Status: SHIPPED | OUTPATIENT
Start: 2023-01-11

## 2023-01-11 NOTE — TELEPHONE ENCOUNTER
Received fax from [employer] requesting 90 day supply on Lexapro.  Last seen 12/9/22 Last filled 12/9/22 # 30 with 12 refills.  Next appointment 12/12/23.  Okay'd Lexapro 90 day supply

## 2023-01-25 ENCOUNTER — TELEPHONE (OUTPATIENT)
Dept: OBSTETRICS AND GYNECOLOGY | Facility: CLINIC | Age: 23
End: 2023-01-25
Payer: COMMERCIAL

## 2023-01-30 RX ORDER — LEVONORGESTREL / ETHINYL ESTRADIOL AND ETHINYL ESTRADIOL 150-30(84)
1 KIT ORAL DAILY
Qty: 1 EACH | Refills: 3 | Status: SHIPPED | OUTPATIENT
Start: 2023-01-30 | End: 2024-01-30

## 2023-11-15 NOTE — NON STRESS TEST
Obstetrical Non-stress Test Interpretation     Name:  Rosa Reid  MRN: [id number]    21 y.o. female  at 32w5d    Indication: IUGR      Baseline: Normal 110-160 bpm  Variability:   Moderate/Normal (amplitude 6-25 bpm)  Accelerations: Present (32 weeks+) 15 x 15 bpm  Decelerations: Absent   Contractions:  Absent       Impression:  Category I       Mary Hill MD  2022  14:22 EDT     no

## 2023-12-12 ENCOUNTER — TELEPHONE (OUTPATIENT)
Dept: OBSTETRICS AND GYNECOLOGY | Facility: CLINIC | Age: 23
End: 2023-12-12

## 2024-07-05 ENCOUNTER — OFFICE VISIT (OUTPATIENT)
Dept: PSYCHIATRY | Facility: CLINIC | Age: 24
End: 2024-07-05
Payer: COMMERCIAL

## 2024-07-05 VITALS
OXYGEN SATURATION: 99 % | SYSTOLIC BLOOD PRESSURE: 128 MMHG | HEIGHT: 64 IN | WEIGHT: 166.13 LBS | DIASTOLIC BLOOD PRESSURE: 83 MMHG | HEART RATE: 70 BPM | BODY MASS INDEX: 28.36 KG/M2

## 2024-07-05 DIAGNOSIS — F31.4 BIPOLAR DISORDER, CURRENT EPISODE DEPRESSED, SEVERE, WITHOUT PSYCHOTIC FEATURES: Primary | ICD-10-CM

## 2024-07-05 DIAGNOSIS — F43.10 COMPLEX POSTTRAUMATIC STRESS DISORDER: ICD-10-CM

## 2024-07-05 DIAGNOSIS — F41.1 GAD (GENERALIZED ANXIETY DISORDER): ICD-10-CM

## 2024-07-05 RX ORDER — VENLAFAXINE HYDROCHLORIDE 150 MG/1
150 CAPSULE, EXTENDED RELEASE ORAL DAILY
COMMUNITY

## 2024-07-05 RX ORDER — OLANZAPINE 5 MG/1
5 TABLET ORAL NIGHTLY
Qty: 30 TABLET | Refills: 2 | Status: SHIPPED | OUTPATIENT
Start: 2024-07-05 | End: 2024-10-03

## 2024-07-05 RX ORDER — CLONIDINE HYDROCHLORIDE 0.1 MG/1
0.1 TABLET ORAL 2 TIMES DAILY
Qty: 60 TABLET | Refills: 1 | Status: SHIPPED | OUTPATIENT
Start: 2024-07-05 | End: 2024-09-03

## 2024-07-05 RX ORDER — AMOXICILLIN 500 MG/1
CAPSULE ORAL
COMMUNITY
Start: 2024-02-06 | End: 2024-07-05

## 2024-07-05 NOTE — PROGRESS NOTES
"[facility]  Initial Evaluation    Referring Provider:  Sridevi Busby, APRN  [address] [facility]  [address] [place] [id number]    Chief Complaint: \"I am having issues with slowing my brain down\"     History of Present Illness: Rosa Reid is a 23 y.o. female who presents today for initial evaluation regarding  Rosa presents unaccompanied in no acute distress and engages with me appropriately. Psychotropic regimen with which patient presents is described as venlafaxine 150 mg po q day.   Imani presents today with the desires to talk about her persistent anxiety symptoms.  Imani complains of struggling with \"slowing her brain down\" and poor sleep.  History is positive for a life long consistent and excessive worry across several domains of life that contributes to tension and irritability throughout the day.   History is positive for signs/symptoms suggestive of history of periods with reduced need for sleep, excess energy (racing thoughts) distractibility, irritability, impulsivity typically lasting 2-5 days alternating with several-week periods of depressive symptoms. She endorses having racing thoughts, abnormally upbeat/wired, exaggerated sense of well-being and self-confidence, unusual talkativeness, poor decision making, decreased need for sleep. Increased activity, energy, or agitation.  Note that Imani endorses never having to be hospitalized for these symptoms.  Imani also endorses a family history of bipolar disorder with both parents.     Rosa has a history of early exposure to enduring trauma associated with re-experiencing trauma, avoidance, hyperarousal (PTSD) and difficulty managing emotions, negative self-view, relationship difficulties, dissociative symptoms, and demoralization (complex PTSD).  Imani is motivated to receive psychotherapy to further explore her history of childhood trauma.     Psychiatric screening is negative for " pathognomonic history of: psychosis, violence, and suicidality.     I have counseled the patient with regard to diagnoses and the recommended treatment regimen as documented below: I will assume prescriptive responsibility for venlafaxine 150 mg po q HS.  will prescribe olanzapine  for bipolar symptoms. Patient has been advised that this agent has propensity to contribute to EPS (particularly dystonia, tremor, akathisia, and TD), weight gain, dyslipidemia, hyperglycemia, reduced arousal, and somnolence. Additionally, risks regarding DRESS, NMS, and diminished seizure threshold have been reviewed today. I will begin clonidine for anxiety; I have advised this agent has propensity to diminish blood pressure and may result in dizziness, sedation, xerostomia. Patient acknowledges the diagnoses per my rendered interpretation. Patient demonstrates awareness/understanding of viable alternatives for treatment as well as potential risks, benefits, and side effects associated with this regimen and is amenable to proceed in this fashion.     Recommended lifestyle changes: 30 minutes of activity to increase HR 2-3 days weekly.    Psychiatric History:  Diagnoses: anxiety and depression diagnoses in adolescence   Outpatient history: none  Inpatient history: none  Medication trials: sertraline, venlafaxine  Other treatment modalities: none  Self harm: No history  Suicide attempts: No  Abuse or neglect: emotional    Substance Abuse History:   Types/methods/frequency: *none  Transtheoretical stage: none    Social History:  Residence: lives house, boyfriend and two daughter, 4 and 1 and 1/2, 4 dogs, and a cat  Vocation: no  Source of income: none  Last grade completed: high  Pertinent developmental history: dyslexia  Pertinent legal history: none  Hobbies/interests: kayaking, hanging out with friends, gardening   Worship: N/A  Exercise:.outside with dogs, kayaking, walking  Dietary habits: no pertinent issues   Sleep hygiene: takes a  long time to get to sleep, 30 minutes-1 hour, 3-5 hours per night  Social habits: no pertinent issues   Sunlight: There are no concern for under-exposure.  Caffeine intake: no pertinent issues   Hydration habits: no pertinent issues    history: No    Social History     Socioeconomic History    Marital status: Single    Number of children: 1   Tobacco Use    Smoking status: Never    Smokeless tobacco: Never   Vaping Use    Vaping status: Never Used   Substance and Sexual Activity    Alcohol use: Never    Drug use: Never    Sexual activity: Yes     Partners: Male     Access to Firearms: yes, secured    Tobacco use counseling/intervention: N/A, patient does not use tobacco    PHQ-9 Depression Screening  PHQ-9 Total Score: 19    Little interest or pleasure in doing things? 1-->several days   Feeling down, depressed, or hopeless? 2-->more than half the days   Trouble falling or staying asleep, or sleeping too much? 3-->nearly every day   Feeling tired or having little energy? 2-->more than half the days   Poor appetite or overeating? 3-->nearly every day   Feeling bad about yourself - or that you are a failure or have let yourself or your family down? 3-->nearly every day   Trouble concentrating on things, such as reading the newspaper or watching television? 3-->nearly every day   Moving or speaking so slowly that other people could have noticed? Or the opposite - being so fidgety or restless that you have been moving around a lot more than usual? 2-->more than half the days   Thoughts that you would be better off dead, or of hurting yourself in some way? 0-->not at all   PHQ-9 Total Score 19     ESTRELLITA-7  Feeling nervous, anxious or on edge: Nearly every day  Not being able to stop or control worrying: Nearly every day  Worrying too much about different things: Nearly every day  Trouble Relaxing: More than half the days  Being so restless that it is hard to sit still: More than half the days  Feeling afraid as if  something awful might happen: More than half the days  Becoming easily annoyed or irritable: Nearly every day  ESTRELLITA 7 Total Score: 18  If you checked any problems, how difficult have these problems made it for you to do your work, take care of things at home, or get along with other people: Somewhat difficult    Problem List:  Patient Active Problem List   Diagnosis    Abnormal serum thyroxine (T4) level     Allergy:   Allergies   Allergen Reactions    Azithromycin Rash    Poison Ivy Extract Swelling    Poison Oak Extract Swelling        Discontinued Medications:  Medications Discontinued During This Encounter   Medication Reason    amoxicillin (AMOXIL) 500 MG capsule *Therapy completed    escitalopram (LEXAPRO) 10 MG tablet Alternate therapy    acetaminophen (TYLENOL) 325 MG tablet *Therapy completed    Levonorgest-Eth Estrad 91-Day (Seasonique) 0.15-0.03 &0.01 MG tablet Patient Reported Not Taking    PRENATAL MV-MIN-FE FUM-FA-DHA PO *Therapy completed    labetalol (NORMODYNE) 200 MG tablet Patient Reported Not Taking       Current Medications:   Current Outpatient Medications   Medication Sig Dispense Refill    venlafaxine XR (EFFEXOR-XR) 150 MG 24 hr capsule Take 1 capsule by mouth Daily.       No current facility-administered medications for this visit.     Past Medical History:  Past Medical History:   Diagnosis Date    Anxiety     ASD (atrial septal defect)     Depression     History of migraine withOUT aura     HSV-1 infection/ serum positive 09/23/2022    Valtrex 1000mg daily     IUGR 08/01/2022 7/27 MFM 9%ile, prominent aorta.  Weekly fetal dopplers, Fetal echo scheduled 8/5, weekly fetal testing at 28weeks  8/4 IUGR Prominent aorta/poss VSD MFM rec weekly doppler, weekly fetal testing at 28weeks if FGR persists  8/25 MFM 2#4oz, <3%ile, AC <3%ile, nl dopplers.  Rec biweek APT alt MFM and [facility], US growth 3weeks, fetal echo pt rescheduled to 9/19    Patent foramen ovale 02/03/2020 6/8/2022: Cardiology  "visit completed.  No anticipated cardiac issues.  No further recommendations or follow-up recommended. 2022: MFM consult and ultrasound performed.  Limited anatomy.  Recommended  fetal echo     Restless leg syndrome 10/10/2022    Vitamin D deficiency 2022    Supplementation started Aug 21, 2022     Past Surgical History:  Past Surgical History:   Procedure Laterality Date    TONSILLECTOMY       Family History:   Family History   Problem Relation Age of Onset    Breast cancer Maternal Aunt 35    Cervical cancer Maternal Aunt     Deep vein thrombosis Neg Hx     Pulmonary embolism Neg Hx        Mental Status Exam:   Observations:  Appearance: Neat  Speech: Normal  Eye Contact: Normal  Motor Activity: Normal  Affect:Full  Comments:  Mood:Mood: Euthymic  Cognition  Orientation Impairment: None  Memory Impairment: None  Attention: Normal  Comments:  Perception  Hallucinations:None  Other:   Comments:  Thoughts:  Suicidality:None  Homicidality:None  Delusions:  None  Comments:  Behavior:Behavior: Cooperative  Insight: Insight: Good  Judgement:Insight: Good    Vital Signs:   /83   Pulse 70   Ht 162.6 cm (64\")   Wt 75.4 kg (166 lb 2 oz)   SpO2 99%   BMI 28.52 kg/m²    Lab Results:   No visits with results within 6 Month(s) from this visit.   Latest known visit with results is:   Admission on 10/31/2022, Discharged on 10/31/2022   Component Date Value Ref Range Status    Creatinine, Urine 10/31/2022 21.5  mg/dL Final    Total Protein, Urine 10/31/2022 6.5  mg/dL Final    Protein/Creatinine Ratio, Urine 10/31/2022 0.30   Final    Glucose 10/31/2022 83  65 - 99 mg/dL Final    BUN 10/31/2022 8  6 - 20 mg/dL Final    Creatinine 10/31/2022 0.81  0.57 - 1.00 mg/dL Final    Sodium 10/31/2022 144  136 - 145 mmol/L Final    Potassium 10/31/2022 3.9  3.5 - 5.2 mmol/L Final    Chloride 10/31/2022 108 (H)  98 - 107 mmol/L Final    CO2 10/31/2022 26.4  22.0 - 29.0 mmol/L Final    Calcium 10/31/2022 9.2  8.6 - " 10.5 mg/dL Final    Total Protein 10/31/2022 6.1  6.0 - 8.5 g/dL Final    Albumin 10/31/2022 3.60  3.50 - 5.20 g/dL Final    ALT (SGPT) 10/31/2022 30  1 - 33 U/L Final    AST (SGOT) 10/31/2022 29  1 - 32 U/L Final    Alkaline Phosphatase 10/31/2022 134 (H)  39 - 117 U/L Final    Total Bilirubin 10/31/2022 0.3  0.0 - 1.2 mg/dL Final    Globulin 10/31/2022 2.5  gm/dL Final    A/G Ratio 10/31/2022 1.4  g/dL Final    BUN/Creatinine Ratio 10/31/2022 9.9  7.0 - 25.0 Final    Anion Gap 10/31/2022 9.6  5.0 - 15.0 mmol/L Final    eGFR 10/31/2022 104.8  >60.0 mL/min/1.73 Final    National Kidney Foundation and American Society of Nephrology (ASN) Task Force recommended calculation based on the Chronic Kidney Disease Epidemiology Collaboration (CKD-EPI) equation refit without adjustment for race.    LDH 10/31/2022 287 (H)  135 - 214 U/L Final    Uric Acid 10/31/2022 7.1 (H)  2.4 - 5.7 mg/dL Final    WBC 10/31/2022 6.58  3.40 - 10.80 10*3/mm3 Final    RBC 10/31/2022 4.43  3.77 - 5.28 10*6/mm3 Final    Hemoglobin 10/31/2022 11.4 (L)  12.0 - 15.9 g/dL Final    Hematocrit 10/31/2022 36.2  34.0 - 46.6 % Final    MCV 10/31/2022 81.7  79.0 - 97.0 fL Final    MCH 10/31/2022 25.7 (L)  26.6 - 33.0 pg Final    MCHC 10/31/2022 31.5  31.5 - 35.7 g/dL Final    RDW 10/31/2022 17.8 (H)  12.3 - 15.4 % Final    RDW-SD 10/31/2022 52.3  37.0 - 54.0 fl Final    MPV 10/31/2022 10.8  6.0 - 12.0 fL Final    Platelets 10/31/2022 199  140 - 450 10*3/mm3 Final    Neutrophil % 10/31/2022 69.8  42.7 - 76.0 % Final    Lymphocyte % 10/31/2022 20.2  19.6 - 45.3 % Final    Monocyte % 10/31/2022 5.6  5.0 - 12.0 % Final    Eosinophil % 10/31/2022 3.5  0.3 - 6.2 % Final    Basophil % 10/31/2022 0.6  0.0 - 1.5 % Final    Immature Grans % 10/31/2022 0.3  0.0 - 0.5 % Final    Neutrophils, Absolute 10/31/2022 4.59  1.70 - 7.00 10*3/mm3 Final    Lymphocytes, Absolute 10/31/2022 1.33  0.70 - 3.10 10*3/mm3 Final    Monocytes, Absolute 10/31/2022 0.37  0.10 - 0.90  10*3/mm3 Final    Eosinophils, Absolute 10/31/2022 0.23  0.00 - 0.40 10*3/mm3 Final    Basophils, Absolute 10/31/2022 0.04  0.00 - 0.20 10*3/mm3 Final    Immature Grans, Absolute 10/31/2022 0.02  0.00 - 0.05 10*3/mm3 Final    nRBC 10/31/2022 0.0  0.0 - 0.2 /100 WBC Final       ASSESSMENT AND PLAN:    ICD-10-CM ICD-9-CM   1. Bipolar disorder, current episode depressed, severe, without psychotic features  F31.4 296.53   2. ESTRELLITA (generalized anxiety disorder)  F41.1 300.02       Rosa who presents today for initial evaluation regarding medication . We have discussed the history and interpreted diagnoses as above as well as the treatment plan below, including potential R/B/SE of the recommended regimen of which the patient demonstrates understanding. Patient is agreeable to call 911 or go to the nearest ER should she become concerned for her own safety and/or the safety of those around her. There are not overt indices of acute hypomania/pamela/psychosis on evaluation today.     Medication regimen: Begin olanzapine 5 mg p.o. nightly, begin clonidine 0.1 mg p.o. twice daily as needed anxiety (agitation) continue venlafaxine 150 mg p.o. daily; patient is advised not to misuse prescribed medications or to use any exogenous substances that aren't disclosed to this provider as they may interact with the regimen to her detriment.   Risk Assessment: protracted risk is moderate, imminent risk is low moderate.  Risk factors include: anxiety disorder, mood disorder, and recent/ongoing psychosocial stressors. Protective factors include: no known family history of suicidality, intact reality testing, no substance use disorder,, no present SI, no stated history of suicide attempts or self-harm, patient's exhibited future-orientation, strong social support, and patient's cooperation with care. Do note that this is subject to change with the [employer] of new stressors, treatment non-adherence, use of substances, and/or new medical  zoila.  Monitoring: reviewed labs/imaging as populated above, reviewed TSH and free T4 from [facility], PHQ-9 today is PHQ-9 Total Score: 19 /27, ESTRELLITA-7 today is 18/21  Therapy: Referred to MIHIR Francisco  Follow-up: one month  Communications: N/A    TREATMENT PLAN/GOALS: challenge patterns of living conducive to symptom burden, implement recommended regimen as above with augmentative, intermittent supportive psychotherapy to reduce symptom burden. Patient acknowledged and verbally consented to begin treatment as above. The importance of adherence to the recommended treatment and interval follow-up appointments was emphasized today. Patient was today advised to limit daily caffeine intake, hydrate appropriately, eat healthy and nutritious foods, engage sleep hygiene measures, engage appropriate exposure to sunlight, engage with hobbies in balance with life necessities, and exercise appropriate to their capacity to do so.     Billing: I have seen the patient today and considered her psychiatric complaints, rendered a diagnosis, and discussed treatment with the patient as above with which she consents.    Parts of this note are electronic transcriptions/translations of spoken language to printed text using the Dragon Dictation system.    Electronically signed by YULI Dean, 07/05/24,

## 2024-07-05 NOTE — TREATMENT PLAN
Multi-Disciplinary Problems (from Behavioral Health Treatment Plan)      Active Problems       Problem: Anxiety  Start Date: 07/05/24      Problem Details: The patient self-scales this problem as a 1 with 10 being the worst.          Goal Priority Start Date Expected End Date End Date    Patient will develop and implement behavioral and cognitive strategies to reduce anxiety and irrational fears. -- 07/05/24 -- --    Goal Details: Progress toward goal:  The patient self-scales their progress related to this goal as a 2 with 10 being the worst.          Goal Intervention Frequency Start Date End Date    Help patient explore past emotional issues in relation to present anxiety. Weekly 07/05/24 --    Intervention Details: Duration of treatment until until remission of symptoms.          Goal Intervention Frequency Start Date End Date    Help patient develop an awareness of their cognitive and physical responses to anxiety. Weekly 07/05/24 --    Intervention Details: Duration of treatment until until remission of symptoms.                          Reviewed By       Yola Benson APRN 07/05/24 [id number]                     I have discussed and reviewed this treatment plan with the patient.  It has been printed for signatures.

## 2024-07-23 ENCOUNTER — TELEPHONE (OUTPATIENT)
Dept: PSYCHIATRY | Facility: CLINIC | Age: 24
End: 2024-07-23
Payer: COMMERCIAL

## 2024-07-23 NOTE — TELEPHONE ENCOUNTER
Patient was referred out to [facility], several attempts made to contact patient to schedule with no success, including mailing a contact letter, closing out referral order.

## 2024-07-26 NOTE — PROGRESS NOTES
"Progress Note    Date: 08/05/2024  Time In: 1:00  Time Out: 1:55    Patient Legal Name: Rosa Reid  Patient Age: 23 y.o.  Referring Provider:   Yola Benson, Aprn  [address]  [place] [address]     Mode of visit: In person  Location of provider: [address], [place] [address]  Location of patient: Office    CHIEF COMPLAINT: anxiety, depression    Subjective   History of Present Illness     Rosa is a 23 y.o. female presenting for initial session with this therapist. Patient reported she feels constantly anxious and often agitated.  Patient stated she thinks she has always been \"angry.\"  Patient shared she is going through a breakup right now with her boyfriend. . Patient advised she feels her parent's hx of anger and fighting has affected her.  Patient reported she worries about everything and she does not like to be in crowds.  Patient shared she sometimes wakes up feeling agitated or gets aggravated by something small.. Patient described she is trying to use breathing to help calm herself when angry and to slow down and think before she speaks. Patient reported her ex-boyfriend also used to help her to calm down when agitated.  Patient reported her youngest child is special needs. Patient advised she has a hard time taking time for herself. Patient stated she feels like her brain runs constantly and it keeps her from falling to sleep. Patient advised she struggled with focus when in school and she gets easily distracted now from tasks. Patient stated she feels she gets overwhelmed easily. Patient is voluntarily requesting to participate in outpatient therapy at [facility].  Patient rated her anxiety at 7-8 and depression 7 today on a 0-10 scale where 0= no symptoms.    History obtained from referring provider's note on 7/5/24:  Psychiatric History:  Diagnoses: anxiety and depression diagnoses in adolescence   Outpatient history: none  Inpatient " history: none  Medication trials: sertraline, venlafaxine  Other treatment modalities: none  Self harm: No history  Suicide attempts: No  Abuse or neglect: emotional      Assessment    Mental Status Exam     Appearance: good hygiene and dressed appropriately for the weather  Behavior: calm  Cooperation:  engaged, cooperative, attentive, and friendly  Eye Contact:  good  Affect:  congruent  Mood: expressive  Speech: talkative  Thought Process:  organized  Thought Content: appropriate  Suicidal: denies  Homicidal:  denies  Hallucinations:  denies  Memory:  intact  Orientation:  person, place, time, and situation  Reliability:  reliable  Insight:  good  Judgment:  good     Clinical Intervention       ICD-10-CM ICD-9-CM   1. Generalized anxiety disorder  F41.1 300.02   2. Bipolar disorder, current episode depressed, severe, without psychotic features  F31.4 296.53        Individual psychotherapy was provided utilizing CBT and person-centered techniques to build rapport, encourage expression of thoughts and feelings, support self-esteem, establish new coping skills, assess symptoms, gather history, and provide psychoeducation. Therapist provided psychoeducation on the cognitive behavioral model and seeking/acknowledging positive experiences and thoughts to improve overall mood.  Therapist utilized open-ended questions to encourage the development of a positive therapeutic relationship and open communication.  Therapist normalized/validated patient’s thoughts and feelings as appropriate. Discussed using lists and alarms to manage chore completion. Patient rated her anxiety at 7-8 and depression 7 today on a 0-10 scale where 0= no symptoms.    Plan   Plan & Goals     Moving forward, we will continue to build rapport and reinforce and build upon effective coping strategies utilizing CBT and person-centered techniques.    Patient acknowledged and verbally consented to continue working toward resolving current treatment plan  goals and was educated on the importance of participation in the therapeutic process.  Patient will remain compliant with medication regimen as prescribed. Discuss any medication side effects, questions or concerns with prescribing provider.  Call 911 or present to the nearest emergency room in an emergency situation.   National Suicide Prevention Lifeline: Call 988. The Lifeline provides 24/7, free and confidential support for people in distress, prevention and crisis resources.  Crisis Text Line  Text HOME To 875182    Return in about 2 weeks (around 8/19/2024).    ____________________  This document has been electronically signed by Rosaura Francisco LCSW  August 5, 2024 14:09 EDT    Part of this note may be an electronic transcription/translation of spoken language to printed text using the Dragon Dictation System.

## 2024-08-01 NOTE — PROGRESS NOTES
"[facility]  Follow-up Visit    Chief Complaint:  \"I am having issues with slowing my brain down\"      History of Present Illness: Rosa Reid is a 23 y.o. female who presents today for initial evaluation regarding  Rosa presents unaccompanied in no acute distress and engages with me appropriately. Psychotropic regimen with which patient presents is described as venlafaxine 150 mg po q day.   Imani presents today with the desires to talk about her persistent anxiety symptoms.  Imani complains of struggling with \"slowing her brain down\" and poor sleep.  History is positive for a life long consistent and excessive worry across several domains of life that contributes to tension and irritability throughout the day.   History is positive for signs/symptoms suggestive of history of periods with reduced need for sleep, excess energy (racing thoughts) distractibility, irritability, impulsivity typically lasting 2-5 days alternating with several-week periods of depressive symptoms. She endorses having racing thoughts, abnormally upbeat/wired, exaggerated sense of well-being and self-confidence, unusual talkativeness, poor decision making, decreased need for sleep. Increased activity, energy, or agitation.  Note that Imani endorses never having to be hospitalized for these symptoms.  Imani also endorses a family history of bipolar disorder with both parents.      Rosa has a history of early exposure to enduring trauma associated with re-experiencing trauma, avoidance, hyperarousal (PTSD) and difficulty managing emotions, negative self-view, relationship difficulties, dissociative symptoms, and demoralization (complex PTSD).  Imani is motivated to receive psychotherapy to further explore her history of childhood trauma.      Psychiatric screening is negative for pathognomonic history of: psychosis, violence, and suicidality.      I have counseled the patient with regard to " diagnoses and the recommended treatment regimen as documented below: I will assume prescriptive responsibility for venlafaxine 150 mg po q HS.  will prescribe olanzapine  for bipolar symptoms. Patient has been advised that this agent has propensity to contribute to EPS (particularly dystonia, tremor, akathisia, and TD), weight gain, dyslipidemia, hyperglycemia, reduced arousal, and somnolence. Additionally, risks regarding DRESS, NMS, and diminished seizure threshold have been reviewed today. I will begin clonidine for anxiety; I have advised this agent has propensity to diminish blood pressure and may result in dizziness, sedation, xerostomia. Patient acknowledges the diagnoses per my rendered interpretation. Patient demonstrates awareness/understanding of viable alternatives for treatment as well as potential risks, benefits, and side effects associated with this regimen and is amenable to proceed in this fashion.      Recommended lifestyle changes: 30 minutes of activity to increase HR 2-3 days weekly.     Psychiatric History:  Diagnoses: anxiety and depression diagnoses in adolescence   Outpatient history: none  Inpatient history: none  Medication trials: sertraline, venlafaxine  Other treatment modalities: none  Self harm: No history  Suicide attempts: No  Abuse or neglect: emotional     Substance Abuse History:   Types/methods/frequency: *none  Transtheoretical stage: none     Social History:  Residence: lives house, boyfriend and two daughter, 4 and 1 and 1/2, 4 dogs, and a cat  Vocation: no  Source of income: none  Last grade completed: high  Pertinent developmental history: dyslexia  Pertinent legal history: none  Hobbies/interests: kayaking, hanging out with friends, gardening   Confucianism: N/A  Exercise:.outside with dogs, kayaking, walking  Dietary habits: no pertinent issues   Sleep hygiene: takes a long time to get to sleep, 30 minutes-1 hour, 3-5 hours per night  Social habits: no pertinent issues  "  Sunlight: There are no concern for under-exposure.  Caffeine intake: no pertinent issues   Hydration habits: no pertinent issues    history: No            Interval History Rosa is a 23 y.o. female who presents today for follow-up.  Rosa presents unaccompanied in no acute distress and engages with me appropriately.     Current treatment regimen includes:   - olanzapine 5 mg po q HS (has not started)  -venlafaxine 5 150 mg po q HS  -clonidine 0.1 mg po BID  Side-effects per given history: no issues, still needs to take olanzapine 5 mg po q HS.      Today the patient feels \"okay\" she is having some anxiety in regard to her relationship with her boyfriend.  She and her boyfriend are deciding if they should separate which is difficult because they have 2 children together.  Imani has yet to start her olanzapine she fears that she will be too sedated at night to hear her 2-year-old with special needs if she needs anything.  She does report having a \"low\" a few weeks ago that lasted about 2 weeks-with crying jags.    Thought process and content are devoid of overt aberration suggestive of acute pamela/psychosis. The patient denies SI/HI/AVH. There are changes on exam today compared to most recent evaluation.    - sleep: better  - appetite: poorly controlled    I have counseled the patient with regard to diagnoses and the recommended treatment regimen as documented below.  Due to Imani's hesitation to start olanzapine I will switch atypical to aripiprazole 5 mg p.o. daily.  patient acknowledges the diagnoses per my rendered interpretation.I will be prescribing aripiprazole for mood. Patient has been advised this agent has propensity to contribute to EPS (particularly dystonia, tremor, akathisia, and TD), changes in arousal, and - in rare cases - has contributed to development of pathological gambling habits.  Patient demonstrates understanding of potential risks/benefits/side effects associated with this " regimen and is amenable to proceed in this fashion.     Assignment: begin journaling instances of overwhelm, response thereto, ideal response to cultivate insight and begin breaking a pattern of stimulus/response.      Social History     Socioeconomic History    Marital status: Single    Number of children: 1   Tobacco Use    Smoking status: Never    Smokeless tobacco: Never   Vaping Use    Vaping status: Never Used   Substance and Sexual Activity    Alcohol use: Never    Drug use: Never    Sexual activity: Yes     Partners: Male       Tobacco use counseling/intervention: patient does not use tobacco.   Problem List:  Patient Active Problem List   Diagnosis    Abnormal serum thyroxine (T4) level     Allergy:   Allergies   Allergen Reactions    Azithromycin Rash    Poison Ivy Extract Swelling    Poison Oak Extract Swelling        Discontinued Medications:  There are no discontinued medications.    Current Medications:   Current Outpatient Medications   Medication Sig Dispense Refill    cloNIDine (Catapres) 0.1 MG tablet Take 1 tablet by mouth 2 (Two) Times a Day for 60 days. 60 tablet 1    OLANZapine (ZyPREXA) 5 MG tablet Take 1 tablet by mouth Every Night for 90 days. 30 tablet 2    venlafaxine XR (EFFEXOR-XR) 150 MG 24 hr capsule Take 1 capsule by mouth Daily.       No current facility-administered medications for this visit.     Past Medical History:  Past Medical History:   Diagnosis Date    Anxiety     ASD (atrial septal defect)     Depression     History of migraine withOUT aura     HSV-1 infection/ serum positive 09/23/2022    Valtrex 1000mg daily     IUGR 08/01/2022 7/27 MFM 9%ile, prominent aorta.  Weekly fetal dopplers, Fetal echo scheduled 8/5, weekly fetal testing at 28weeks  8/4 IUGR Prominent aorta/poss VSD MFM rec weekly doppler, weekly fetal testing at 28weeks if FGR persists  8/25 MFM 2#4oz, <3%ile, AC <3%ile, nl dopplers.  Rec biweek APT alt MFM and [facility], US growth 3weeks, fetal echo pt  "rescheduled to     Patent foramen ovale 2020: Cardiology visit completed.  No anticipated cardiac issues.  No further recommendations or follow-up recommended. 2022: MFM consult and ultrasound performed.  Limited anatomy.  Recommended  fetal echo     Restless leg syndrome 10/10/2022    Vitamin D deficiency 2022    Supplementation started Aug 21, 2022     Past Surgical History:  Past Surgical History:   Procedure Laterality Date    TONSILLECTOMY         MENTAL STATUS EXAM   General Appearance:  Cleanly groomed and dressed and well developed  Eye Contact:  Good eye contact  Attitude:  Cooperative, polite and candid  Motor Activity:  Normal gait, posture and fidgety  Muscle Strength:  Normal  Speech:  Normal rate, tone, volume  Language:  Spontaneous  Mood and affect:  Normal, pleasant  Hopelessness:  Denies  Loneliness: Denies  Thought Process:  Logical  Hallucinations:  None  Suicidal Ideations:  Not present  Homicidal Ideation:  Not present  Sensorium:  Alert and clear  Orientation:  Person, place, time and situation  Immediate Recall, Recent, and Remote Memory:  Intact  Attention Span/ Concentration:  Good  Fund of Knowledge:  Appropriate for age and educational level  Intellectual Functioning:  Above average  Insight:  Good  Judgement:  Good  Reliability:  Good  Impulse Control:  Good      Vital Signs:   /91   Pulse 84   Ht 162.6 cm (64\")   Wt 73.7 kg (162 lb 6.4 oz)   BMI 27.88 kg/m²    Lab Results:   No visits with results within 6 Month(s) from this visit.   Latest known visit with results is:   Admission on 10/31/2022, Discharged on 10/31/2022   Component Date Value Ref Range Status    Creatinine, Urine 10/31/2022 21.5  mg/dL Final    Total Protein, Urine 10/31/2022 6.5  mg/dL Final    Protein/Creatinine Ratio, Urine 10/31/2022 0.30   Final    Glucose 10/31/2022 83  65 - 99 mg/dL Final    BUN 10/31/2022 8  6 - 20 mg/dL Final    Creatinine 10/31/2022 0.81  0.57 " - 1.00 mg/dL Final    Sodium 10/31/2022 144  136 - 145 mmol/L Final    Potassium 10/31/2022 3.9  3.5 - 5.2 mmol/L Final    Chloride 10/31/2022 108 (H)  98 - 107 mmol/L Final    CO2 10/31/2022 26.4  22.0 - 29.0 mmol/L Final    Calcium 10/31/2022 9.2  8.6 - 10.5 mg/dL Final    Total Protein 10/31/2022 6.1  6.0 - 8.5 g/dL Final    Albumin 10/31/2022 3.60  3.50 - 5.20 g/dL Final    ALT (SGPT) 10/31/2022 30  1 - 33 U/L Final    AST (SGOT) 10/31/2022 29  1 - 32 U/L Final    Alkaline Phosphatase 10/31/2022 134 (H)  39 - 117 U/L Final    Total Bilirubin 10/31/2022 0.3  0.0 - 1.2 mg/dL Final    Globulin 10/31/2022 2.5  gm/dL Final    A/G Ratio 10/31/2022 1.4  g/dL Final    BUN/Creatinine Ratio 10/31/2022 9.9  7.0 - 25.0 Final    Anion Gap 10/31/2022 9.6  5.0 - 15.0 mmol/L Final    eGFR 10/31/2022 104.8  >60.0 mL/min/1.73 Final    National Kidney Foundation and American Society of Nephrology (ASN) Task Force recommended calculation based on the Chronic Kidney Disease Epidemiology Collaboration (CKD-EPI) equation refit without adjustment for race.    LDH 10/31/2022 287 (H)  135 - 214 U/L Final    Uric Acid 10/31/2022 7.1 (H)  2.4 - 5.7 mg/dL Final    WBC 10/31/2022 6.58  3.40 - 10.80 10*3/mm3 Final    RBC 10/31/2022 4.43  3.77 - 5.28 10*6/mm3 Final    Hemoglobin 10/31/2022 11.4 (L)  12.0 - 15.9 g/dL Final    Hematocrit 10/31/2022 36.2  34.0 - 46.6 % Final    MCV 10/31/2022 81.7  79.0 - 97.0 fL Final    MCH 10/31/2022 25.7 (L)  26.6 - 33.0 pg Final    MCHC 10/31/2022 31.5  31.5 - 35.7 g/dL Final    RDW 10/31/2022 17.8 (H)  12.3 - 15.4 % Final    RDW-SD 10/31/2022 52.3  37.0 - 54.0 fl Final    MPV 10/31/2022 10.8  6.0 - 12.0 fL Final    Platelets 10/31/2022 199  140 - 450 10*3/mm3 Final    Neutrophil % 10/31/2022 69.8  42.7 - 76.0 % Final    Lymphocyte % 10/31/2022 20.2  19.6 - 45.3 % Final    Monocyte % 10/31/2022 5.6  5.0 - 12.0 % Final    Eosinophil % 10/31/2022 3.5  0.3 - 6.2 % Final    Basophil % 10/31/2022 0.6  0.0 - 1.5 %  Final    Immature Grans % 10/31/2022 0.3  0.0 - 0.5 % Final    Neutrophils, Absolute 10/31/2022 4.59  1.70 - 7.00 10*3/mm3 Final    Lymphocytes, Absolute 10/31/2022 1.33  0.70 - 3.10 10*3/mm3 Final    Monocytes, Absolute 10/31/2022 0.37  0.10 - 0.90 10*3/mm3 Final    Eosinophils, Absolute 10/31/2022 0.23  0.00 - 0.40 10*3/mm3 Final    Basophils, Absolute 10/31/2022 0.04  0.00 - 0.20 10*3/mm3 Final    Immature Grans, Absolute 10/31/2022 0.02  0.00 - 0.05 10*3/mm3 Final    nRBC 10/31/2022 0.0  0.0 - 0.2 /100 WBC Final       ASSESSMENT AND PLAN:    ICD-10-CM ICD-9-CM   1. Bipolar disorder, current episode depressed, severe, without psychotic features  F31.4 296.53   2. ESTRELLITA (generalized anxiety disorder)  F41.1 300.02   3. Complex posttraumatic stress disorder  F43.10 309.81       Rosa is a 23 y.o. female who presents today for follow-up regarding medication management . We have discussed the interval history and the treatment plan below, including potential R/B/SE of the recommended regimen of which the patient demonstrates understanding. Patient is agreeable to call 911 or go to the nearest ER should she become concerned for her own safety and/or the safety of those around her. There are are no overt indices of acute pamela/psychosis on exam today.   Medication regimen: Do not start olanzapine, Start aripiprazole 5 mg po q day, continue venlafaxine  mg p.o. daily continue clonidine 0.1 mg tablet p.o. twice daily as needed anxiety agitation; patient is advised not to misuse prescribed medications or to use them with any exogenous substances that aren't disclosed to this provider as they may interact with the regimen to the patient's detriment.   Risk Assessment: protracted risk is moderate, imminent risk is moderate do note that this is subject to change with the Restorationist of new stressors, treatment non-adherence, use of substances, and/or new medical ails.   Monitoring: reviewed labs/imaging as populated above;  ordered  Therapy: Leo Francisco on 8/ 5  Follow-up: one month  Communications: N/A    TREATMENT PLAN/GOALS: challenge patterns of living conducive to symptom burden, implement recommended regimen as above with augmentative, intermittent supportive psychotherapy to reduce symptom burden. Patient acknowledged and verbally consented to continue treatment. The importance of adherence to the recommended treatment and interval follow-up appointments was again emphasized today: patient has fair treatment adherence per given history. Patient was today reminded to limit daily caffeine intake, hydrate appropriately, eat healthy and nutritious foods, engage sleep hygiene measures, engage appropriate exposure to sunlight, engage with hobbies in balance with life necessities, and exercise appropriate to their capacity to do so.       Parts of this note are electronic transcriptions/translations of spoken language to printed text using the Dragon Dictation system.    Electronically signed by YULI Dean, 08/01/24

## 2024-08-02 ENCOUNTER — OFFICE VISIT (OUTPATIENT)
Dept: PSYCHIATRY | Facility: CLINIC | Age: 24
End: 2024-08-02
Payer: COMMERCIAL

## 2024-08-02 VITALS
BODY MASS INDEX: 27.72 KG/M2 | DIASTOLIC BLOOD PRESSURE: 91 MMHG | WEIGHT: 162.4 LBS | SYSTOLIC BLOOD PRESSURE: 127 MMHG | HEART RATE: 84 BPM | HEIGHT: 64 IN

## 2024-08-02 DIAGNOSIS — F41.1 GAD (GENERALIZED ANXIETY DISORDER): ICD-10-CM

## 2024-08-02 DIAGNOSIS — F31.4 BIPOLAR DISORDER, CURRENT EPISODE DEPRESSED, SEVERE, WITHOUT PSYCHOTIC FEATURES: Primary | ICD-10-CM

## 2024-08-02 DIAGNOSIS — F43.10 COMPLEX POSTTRAUMATIC STRESS DISORDER: ICD-10-CM

## 2024-08-02 RX ORDER — ARIPIPRAZOLE 5 MG/1
5 TABLET ORAL DAILY
Qty: 30 TABLET | Refills: 0 | Status: SHIPPED | OUTPATIENT
Start: 2024-08-02 | End: 2024-09-01

## 2024-08-05 ENCOUNTER — OFFICE VISIT (OUTPATIENT)
Dept: BEHAVIORAL HEALTH | Facility: CLINIC | Age: 24
End: 2024-08-05
Payer: COMMERCIAL

## 2024-08-05 DIAGNOSIS — F41.1 GENERALIZED ANXIETY DISORDER: Primary | ICD-10-CM

## 2024-08-05 DIAGNOSIS — F31.4 BIPOLAR DISORDER, CURRENT EPISODE DEPRESSED, SEVERE, WITHOUT PSYCHOTIC FEATURES: ICD-10-CM

## 2024-08-05 PROCEDURE — 90837 PSYTX W PT 60 MINUTES: CPT | Performed by: SOCIAL WORKER

## 2024-08-07 ENCOUNTER — TELEPHONE (OUTPATIENT)
Dept: PSYCHIATRY | Facility: CLINIC | Age: 24
End: 2024-08-07
Payer: COMMERCIAL

## 2024-09-01 DIAGNOSIS — F41.1 GAD (GENERALIZED ANXIETY DISORDER): ICD-10-CM

## 2024-09-03 NOTE — TELEPHONE ENCOUNTER
NEXT VISIT WITH PROVIDER   Appointment with Yola Benson APRN (09/04/2024)     LAST SEEN BY PROVIDER   Office Visit with Yola Benson APRN (08/02/2024)     LAST MED REFILL  cloNIDine (Catapres) 0.1 MG tablet (07/05/2024)     PROVIDER PLEASE ADVISE

## 2024-09-03 NOTE — PROGRESS NOTES
"Progress Note    Date: 09/13/2024  Time In: 11:00  Time Out: 11:55    Patient Legal Name: Rosa Reid  Patient Age: 23 y.o.    Mode of visit: In person  Location of provider: [address], [place] [address]  Location of patient: Office      CHIEF COMPLAINT: anxiety, depression    Subjective   History of Present Illness   Rosa is a 23 y.o. female who presents today as a follow-up for continued psychotherapy. Patient reported she has had a hectic month.  Patient stated she has de-cluttered her home and it has helped improve her mood.  Patient shared she has been working on co-parenting with her ex. Patient advised she feels she has a better handle on her anger.  Patient reported she has made improvements in self-care.  Patient shared little improvement in sleep. \"I feel a lot better and a lot more calm.\" Patient described feeling less stressed now that her ex is no longer in the home. Patient explained she feels she is \"finding myself.\" Patient is voluntarily requesting to participate in outpatient therapy at [facility].  Patient rated her anxiety at 6 and depression 7-8 today on a 0-10 scale where 0= no symptoms.     History obtained from referring provider's note on 7/5/24:  Psychiatric History:  Diagnoses: anxiety and depression diagnoses in adolescence   Outpatient history: none  Inpatient history: none  Medication trials: sertraline, venlafaxine  Other treatment modalities: none  Self harm: No history  Suicide attempts: No  Abuse or neglect: emotional    Assessment    Mental Status Exam     Appearance: good hygiene and dressed appropriately for the weather  Behavior: calm  Cooperation:  engaged, cooperative, attentive, and friendly  Eye Contact:  good  Affect:  congruent  Mood: expressive  Speech: talkative  Thought Process:  organized  Thought Content: appropriate  Suicidal: denies  Homicidal:  denies  Hallucinations:  denies  Memory:  intact  Orientation:  person, " place, time, and situation  Reliability:  reliable  Insight:  good  Judgment:  good    Clinical Intervention       ICD-10-CM ICD-9-CM   1. Generalized anxiety disorder  F41.1 300.02   2. Bipolar disorder, current episode depressed, severe, without psychotic features  F31.4 296.53        Individual psychotherapy was provided utilizing CBT and person-centered techniques to build rapport, encourage expression of thoughts and feelings, support self-esteem, discuss values and strengths, identify strengths, assess symptoms, and define and establish appropriate boundaries.  Therapist utilized open-ended questions to encourage the development of a positive therapeutic relationship and open communication.  Therapist normalized/validated patient’s thoughts and feelings as appropriate. Discussed using progressive muscle relaxation to help with sleep and provided handout. Patient rated her anxiety at 6 and depression 7-8 today on a 0-10 scale where 0= no symptoms. Processed feelings about patient break up with her friend. Therapeutic interventions included developing and using positive self-talk to challenge negative thoughts. Provided positive affirmations handout.    Plan   Plan & Goals     Moving forward, we will continue to build rapport and reinforce and build upon effective coping strategies utilizing CBT and person-centered techniques.    Patient acknowledged and verbally consented to continue working toward resolving current treatment plan goals and was educated on the importance of participation in the therapeutic process.  Patient will remain compliant with medication regimen as prescribed. Discuss any medication side effects, questions or concerns with prescribing provider.  Call 911 or present to the nearest emergency room in an emergency situation.  National Suicide Prevention Lifeline: Call 988. The Lifeline provides 24/7, free and confidential support for people in distress, prevention and crisis  resources.  Crisis Text Line  Text HOME To 856462    Return in about 3 weeks (around 10/4/2024).    ____________________  This document has been electronically signed by Rosaura Francisco LCSW  September 13, 2024 12:05 EDT    Part of this note may be an electronic transcription/translation of spoken language to printed text using the Dragon Dictation System.

## 2024-09-04 RX ORDER — CLONIDINE HYDROCHLORIDE 0.1 MG/1
0.1 TABLET ORAL 2 TIMES DAILY
Qty: 60 TABLET | Refills: 1 | Status: SHIPPED | OUTPATIENT
Start: 2024-09-04

## 2024-09-12 DIAGNOSIS — F31.4 BIPOLAR DISORDER, CURRENT EPISODE DEPRESSED, SEVERE, WITHOUT PSYCHOTIC FEATURES: ICD-10-CM

## 2024-09-12 RX ORDER — ARIPIPRAZOLE 5 MG/1
5 TABLET ORAL DAILY
Qty: 30 TABLET | Refills: 0 | OUTPATIENT
Start: 2024-09-12

## 2024-09-12 NOTE — TELEPHONE ENCOUNTER
ATTEMPTED TO CONTACT PT(PATIENT) PER PROVIDER'S INSTRUCTIONS      NO ANSWER      LEFT VOICEMAIL WITH INSTRUCTIONS TO RETURN CALL TO OFFICE AT [phone]

## 2024-09-12 NOTE — TELEPHONE ENCOUNTER
REFILL REQUEST FROM THE PHARMACY FOR PTS ABILIFY 5 MG TABLETS.    ARIPiprazole (ABILIFY) 5 MG tablet (08/02/2024)     FOLLOW UP APPT-09/18/2024.  PT LAST SEEN ON 08/02/2024.    PT NO SHOWED APPT ON 09/04/2024.

## 2024-09-13 ENCOUNTER — OFFICE VISIT (OUTPATIENT)
Dept: BEHAVIORAL HEALTH | Facility: CLINIC | Age: 24
End: 2024-09-13
Payer: COMMERCIAL

## 2024-09-13 DIAGNOSIS — F31.4 BIPOLAR DISORDER, CURRENT EPISODE DEPRESSED, SEVERE, WITHOUT PSYCHOTIC FEATURES: ICD-10-CM

## 2024-09-13 DIAGNOSIS — F41.1 GENERALIZED ANXIETY DISORDER: Primary | ICD-10-CM

## 2024-09-13 NOTE — TELEPHONE ENCOUNTER
ATTEMPTED TO CONTACT PT(PATIENT) PER PROVIDER'S INSTRUCTIONS      NO ANSWER      LEFT VOICEMAIL WITH INSTRUCTIONS TO RETURN CALL TO OFFICE AT [phone]

## 2024-09-16 NOTE — TELEPHONE ENCOUNTER
3RD ATTEMPT MADE TO CONTACT PT TO RELAY OF PROVIDERS MESSAGE.    I SPOKE TO PT.  PT EXPRESSED UNDERSTANDING.

## 2025-01-17 DIAGNOSIS — F41.1 GAD (GENERALIZED ANXIETY DISORDER): ICD-10-CM

## 2025-01-17 RX ORDER — CLONIDINE HYDROCHLORIDE 0.1 MG/1
0.1 TABLET ORAL 2 TIMES DAILY
Qty: 60 TABLET | Refills: 1 | OUTPATIENT
Start: 2025-01-17

## 2025-01-17 NOTE — TELEPHONE ENCOUNTER
REFILL REQUEST:     cloNIDine (CATAPRES) 0.1 MG tablet (09/04/2024)     F/UP- NONE IN EPIC.  LOV: 08/02/2024.    PT NO SHOWED APPT ON 09/04/2024; 10/01/2024 AND PROVIDER CANCEL ON 09/18/2024    MEDICATION APPEARS TO BE LAST FILLED ON 12/01/2024